# Patient Record
Sex: MALE
[De-identification: names, ages, dates, MRNs, and addresses within clinical notes are randomized per-mention and may not be internally consistent; named-entity substitution may affect disease eponyms.]

---

## 2020-04-16 ENCOUNTER — HOSPITAL ENCOUNTER (EMERGENCY)
Dept: HOSPITAL 5 - ED | Age: 40
Discharge: PSYCHIATRIC OR SUBSTANCE USE FACILITY | End: 2020-04-16
Payer: MEDICARE

## 2020-04-16 VITALS — DIASTOLIC BLOOD PRESSURE: 72 MMHG | SYSTOLIC BLOOD PRESSURE: 119 MMHG

## 2020-04-16 DIAGNOSIS — Z88.6: ICD-10-CM

## 2020-04-16 DIAGNOSIS — Z79.899: ICD-10-CM

## 2020-04-16 DIAGNOSIS — Z00.00: Primary | ICD-10-CM

## 2020-04-16 DIAGNOSIS — F17.200: ICD-10-CM

## 2020-04-16 LAB
BASOPHILS # (AUTO): 0.1 K/MM3 (ref 0–0.1)
BASOPHILS NFR BLD AUTO: 0.9 % (ref 0–1.8)
BENZODIAZEPINES SCREEN,URINE: (no result)
BILIRUB UR QL STRIP: (no result)
BLOOD UR QL VISUAL: (no result)
BUN SERPL-MCNC: 11 MG/DL (ref 9–20)
BUN/CREAT SERPL: 16 %
CALCIUM SERPL-MCNC: 9.5 MG/DL (ref 8.4–10.2)
EOSINOPHIL # BLD AUTO: 0.1 K/MM3 (ref 0–0.4)
EOSINOPHIL NFR BLD AUTO: 1.1 % (ref 0–4.3)
HCT VFR BLD CALC: 52.4 % (ref 35.5–45.6)
HEMOLYSIS INDEX: 10
HGB BLD-MCNC: 18.5 GM/DL (ref 11.8–15.2)
LYMPHOCYTES # BLD AUTO: 2.2 K/MM3 (ref 1.2–5.4)
LYMPHOCYTES NFR BLD AUTO: 29.6 % (ref 13.4–35)
MCHC RBC AUTO-ENTMCNC: 35 % (ref 32–34)
MCV RBC AUTO: 91 FL (ref 84–94)
METHADONE SCREEN,URINE: (no result)
MONOCYTES # (AUTO): 0.8 K/MM3 (ref 0–0.8)
MONOCYTES % (AUTO): 11.5 % (ref 0–7.3)
MUCOUS THREADS #/AREA URNS HPF: (no result) /HPF
OPIATE SCREEN,URINE: (no result)
PH UR STRIP: 6 [PH] (ref 5–7)
PLATELET # BLD: 185 K/MM3 (ref 140–440)
PROT UR STRIP-MCNC: (no result) MG/DL
RBC # BLD AUTO: 5.78 M/MM3 (ref 3.65–5.03)
RBC #/AREA URNS HPF: 2 /HPF (ref 0–6)
UROBILINOGEN UR-MCNC: 2 MG/DL (ref ?–2)
WBC #/AREA URNS HPF: 1 /HPF (ref 0–6)

## 2020-04-16 PROCEDURE — 80320 DRUG SCREEN QUANTALCOHOLS: CPT

## 2020-04-16 PROCEDURE — 80048 BASIC METABOLIC PNL TOTAL CA: CPT

## 2020-04-16 PROCEDURE — 81001 URINALYSIS AUTO W/SCOPE: CPT

## 2020-04-16 PROCEDURE — 85025 COMPLETE CBC W/AUTO DIFF WBC: CPT

## 2020-04-16 PROCEDURE — G0480 DRUG TEST DEF 1-7 CLASSES: HCPCS

## 2020-04-16 PROCEDURE — 36415 COLL VENOUS BLD VENIPUNCTURE: CPT

## 2020-04-16 PROCEDURE — 80307 DRUG TEST PRSMV CHEM ANLYZR: CPT

## 2020-04-16 NOTE — EMERGENCY DEPARTMENT REPORT
ED Medical Clearance HPI





- General


Chief complaint: Medical Clearance


Stated complaint: EVAL


Time Seen by Provider: 04/16/20 12:03


Source: patient, EMS


Mode of arrival: Ambulatory





- History of Present Illness


Initial comments: 





This is a 39-year-old male nontoxic, well nourished in appearance, no acute 

signs of distress presents to the ED for a medical clearance for [place].  

Patient was sent to receive blood work and urine to be medically cleared.  

Denies any URI symptoms.  Patient denies any suicidal or homicidal ideation.  

Patient otherwise denies any symptoms.  Patient denies any chest pain, shortness

of breath, fever, chills, nausea, vomiting, headache, stiff neck, numbness or 

tingling.  Patient denies any psych conditions currently.


MD Complaint: medical clearance request


Alledged Intoxication: No


Compliant with Home Medications: Yes


Traumatic Symptoms: denies traumatic injury


Associated Symptoms: denies other symptoms.  denies: chest pain, shortness of 

breath, palpitations, diaphoresis, confusion, cough, fever/chills, headaches, an

orexia, malaise, nausea/vomiting, rash, seizure, syncope, weakness


Home medications: 


                                  Previous Rx's











 Medication  Instructions  Recorded  Last Taken  Type


 


FLUoxetine HCL [FLUoxetine] 20 mg PO DAILY #7 tablet 04/16/20 Unknown Rx


 


OLANzapine [Zyprexa] 10 mg PO BID #14 tablet 04/16/20 Unknown Rx


 


hydrOXYzine PAMOATE [Vistaril] 50 mg PO Q12H #14 capsule 04/16/20 Unknown Rx


 


traZODone [Desyrel] 100 mg PO QHS #7 tablet 04/16/20 Unknown Rx











Allergies/Adverse reactions: 


                                    Allergies











Allergy/AdvReac Type Severity Reaction Status Date / Time


 


divalproex sodium Allergy  Unknown Verified 04/16/20 11:45





[From [facility]]     














ED Review of Systems


ROS: 


Stated complaint: EVAL


Other details as noted in HPI





Constitutional: denies: chills, fever


Eyes: denies: eye pain, eye discharge, vision change


ENT: denies: ear pain, throat pain


Respiratory: denies: cough, shortness of breath, wheezing


Cardiovascular: denies: chest pain, palpitations


Endocrine: no symptoms reported


Gastrointestinal: denies: abdominal pain, nausea, diarrhea


Genitourinary: denies: urgency, dysuria


Musculoskeletal: denies: back pain, joint swelling, arthralgia


Skin: denies: rash, lesions


Neurological: denies: headache, weakness, paresthesias


Psychiatric: denies: anxiety, depression


Hematological/Lymphatic: denies: easy bleeding, easy bruising





ED Past Medical Hx





- Past Medical History


Previous Medical History?: No





- Surgical History


Past Surgical History?: No





- Social History


Smoking Status: Current Every Day Smoker





- Medications


Home Medications: 


                                Home Medications











 Medication  Instructions  Recorded  Confirmed  Last Taken  Type


 


FLUoxetine HCL [FLUoxetine] 20 mg PO DAILY #7 tablet 04/16/20  Unknown Rx


 


OLANzapine [Zyprexa] 10 mg PO BID #14 tablet 04/16/20  Unknown Rx


 


hydrOXYzine PAMOATE [Vistaril] 50 mg PO Q12H #14 capsule 04/16/20  Unknown Rx


 


traZODone [Desyrel] 100 mg PO QHS #7 tablet 04/16/20  Unknown Rx














ED Physical Exam





- General


Limitations: No Limitations


General appearance: alert, in no apparent distress





- Head


Head exam: Present: atraumatic, normocephalic





- Eye


Eye exam: Present: normal appearance





- Neck


Neck exam: Present: normal inspection, full ROM.  Absent: tenderness, 

meningismus, lymphadenopathy





- Respiratory


Respiratory exam: Present: normal lung sounds bilaterally.  Absent: respiratory 

distress, wheezes, rales, rhonchi, stridor, chest wall tenderness, accessory 

muscle use, decreased breath sounds, prolonged expiratory





- Cardiovascular


Cardiovascular Exam: Present: regular rate, normal rhythm, normal heart sounds. 

 Absent: irregular rhythm, systolic murmur, diastolic murmur, rubs, gallop





- GI/Abdominal


GI/Abdominal exam: Present: soft, normal bowel sounds.  Absent: distended, 

tenderness, guarding, rebound, rigid, diminished bowel sounds





- Extremities Exam


Extremities exam: Present: normal inspection, full ROM, normal capillary refill.

  Absent: tenderness





- Back Exam


Back exam: Present: normal inspection, full ROM.  Absent: tenderness, CVA 

tenderness (R), CVA tenderness (L), muscle spasm, paraspinal tenderness, 

vertebral tenderness, rash noted





- Neurological Exam


Neurological exam: Present: alert, oriented X3, normal gait





- Psychiatric


Psychiatric exam: Present: normal affect, normal mood





- Skin


Skin exam: Present: warm, dry, intact, normal color.  Absent: rash





ED Course


                                   Vital Signs











  04/16/20





  11:50


 


Temperature 98.2 F


 


Pulse Rate 96 H


 


Respiratory 18





Rate 


 


Blood Pressure 119/72


 


O2 Sat by Pulse 94





Oximetry 














- Reevaluation(s)


Reevaluation #1: 





04/16/20 12:29


Patient is speaking in full sentences with no signs of distress noted.





ED Medical Decision Making





- Lab Data


Result diagrams: 


                                 04/16/20 12:11





                                 04/16/20 12:11





- Medical Decision Making





Patient presents with a medical clearance for [place].  Patient is stable and 

was examined by me.  Brandi Lilly from [facility] has been contacted and was 

instructed patient is medically clear.  Patient's labs are unremarkable.  Urine 

obtained.  Patient is not suicidal or homicidal ideation.  Patient was 

instructed to follow-up with a primary care doctor in 3-5 days or if symptoms 

worsen and continue return to emergency room as soon as possible.  At time of 

discharge, the patient does not seem toxic or ill in appearance.  No acute signs

 of distress noted.  Patient agrees to discharge treatment plan of care.  No 

further questions noted by the patient.








Patient requested for psych medication refill.  Dr. Morgan consulted and to 

prescribed for 1 week.





ED Disposition


Clinical Impression: 


 Medical clearance for psychiatric admission





Disposition: DC/TX-65 PSY HOSP/PSY UNIT


Is pt being admited?: No


Does the pt Need Aspirin: No


Condition: Stable


Additional Instructions: 


Follow-up with a primary care doctor in 3-5 days or if symptoms worsen and 

continue return to emergency room as soon as possible. 


Prescriptions: 


traZODone [Desyrel] 100 mg PO QHS #7 tablet


FLUoxetine HCL [FLUoxetine] 20 mg PO DAILY #7 tablet


hydrOXYzine PAMOATE [Vistaril] 50 mg PO Q12H #14 capsule


OLANzapine [Zyprexa] 10 mg PO BID #14 tablet


Referrals: 


BRENDA TIRADO MD [Primary Care Provider] - 3-5 Days


ELISA CHIU MD [Staff Physician] - 3-5 Days

## 2020-05-05 NOTE — EMERGENCY DEPARTMENT REPORT
ED General Adult HPI





- General


Chief complaint: Psych


Stated complaint: ANXIETY, COUGH


Time Seen by Provider: 05/05/20 04:33


Source: patient, EMS, old records reviewed


Mode of arrival: Ambulatory


Limitations: No Limitations





- History of Present Illness


Initial comments: 





Age start talking and I would usually just say get a this is a 39-year-old male 

with X, Y, and Z level of they are here for this they are here for that and just

with heparin was due


Severity scale (0 -10): 5


Quality: crushing


Consistency: constant


Improves with: cold therapy, eating (I did hit this your patient states that it 

only hurts when they are eating greasy foods)





- Related Data


                                  Previous Rx's











 Medication  Instructions  Recorded  Last Taken  Type


 


FLUoxetine HCL [FLUoxetine] 20 mg PO DAILY #7 tablet 04/16/20 Unknown Rx


 


OLANzapine [Zyprexa] 10 mg PO BID #14 tablet 04/16/20 Unknown Rx


 


hydrOXYzine PAMOATE [Vistaril] 50 mg PO Q12H #14 capsule 04/16/20 Unknown Rx


 


traZODone [Desyrel] 100 mg PO QHS #7 tablet 04/16/20 Unknown Rx











                                    Allergies











Allergy/AdvReac Type Severity Reaction Status Date / Time


 


divalproex sodium Allergy  Unknown Verified 04/16/20 11:45





[From Depakote]     


 


quetiapine [From Seroquel] Allergy  Unknown Verified 05/05/20 03:43














ED Review of Systems


ROS: 


Stated complaint: ANXIETY, COUGH


Other details as noted in HPI





Comment: All other systems reviewed and negative


Constitutional: denies: fever, malaise


Cardiovascular: denies: chest pain


Gastrointestinal: denies: nausea


Psychiatric: denies: auditory hallucinations, visual hallucinations, homicidal 

thoughts, suicidal thoughts





ED Past Medical Hx





- Past Medical History


Previous Medical History?: No





- Surgical History


Past Surgical History?: No





- Social History


Smoking Status: Never Smoker


Substance Use Type: None





- Medications


Home Medications: 


                                Home Medications











 Medication  Instructions  Recorded  Confirmed  Last Taken  Type


 


FLUoxetine HCL [FLUoxetine] 20 mg PO DAILY #7 tablet 04/16/20  Unknown Rx


 


OLANzapine [Zyprexa] 10 mg PO BID #14 tablet 04/16/20  Unknown Rx


 


hydrOXYzine PAMOATE [Vistaril] 50 mg PO Q12H #14 capsule 04/16/20  Unknown Rx


 


traZODone [Desyrel] 100 mg PO QHS #7 tablet 04/16/20  Unknown Rx














ED Physical Exam





- General


Limitations: No Limitations


General appearance: alert, in no apparent distress





- Head


Head exam: Present: atraumatic, normocephalic





- Eye


Eye exam: Present: normal appearance, PERRL, EOMI.  Absent: conjunctival injecti

on, nystagmus, periorbital swelling, periorbital tenderness





- ENT


ENT exam: Present: mucous membranes moist





- Neck


Neck exam: Present: normal inspection





- Respiratory


Respiratory exam: Present: normal lung sounds bilaterally.  Absent: respiratory 

distress, wheezes (The same time you know all all of these are pretty much gives

 you normal examination just), rales, rhonchi





- Cardiovascular


Cardiovascular Exam: Present: regular rate, normal rhythm.  Absent: systolic 

murmur, diastolic murmur, rubs, gallop





- GI/Abdominal


GI/Abdominal exam: Present: soft, normal bowel sounds





- Rectal


Rectal exam: Present: deferred





- Extremities Exam


Extremities exam: Present: normal inspection





- Back Exam


Back exam: Present: normal inspection





- Neurological Exam


Neurological exam: Present: alert, oriented X3





- Psychiatric


Psychiatric exam: Present: normal affect, normal mood





- Skin


Skin exam: Present: warm, dry, intact, normal color.  Absent: rash





ED Course





                                   Vital Signs











  05/05/20





  04:18


 


Temperature 97.1 F L


 


Pulse Rate 69


 


Respiratory 16





Rate 


 


Blood Pressure 124/70





[Right] 


 


O2 Sat by Pulse 97





Oximetry 














ED Medical Decision Making





- Lab Data


Result diagrams: 


                                 05/05/20 04:02





                                 05/05/20 04:02








                                   Lab Results











  05/05/20 05/05/20 05/05/20 Range/Units





  04:02 04:02 04:02 


 


WBC     (4.5-11.0)  K/mm3


 


RBC     (3.65-5.03)  M/mm3


 


Hgb     (11.8-15.2)  gm/dl


 


Hct     (35.5-45.6)  %


 


MCV     (84-94)  fl


 


MCH     (28-32)  pg


 


MCHC     (32-34)  %


 


RDW     (13.2-15.2)  %


 


Plt Count     (140-440)  K/mm3


 


Lymph % (Auto)     (13.4-35.0)  %


 


Mono % (Auto)     (0.0-7.3)  %


 


Eos % (Auto)     (0.0-4.3)  %


 


Baso % (Auto)     (0.0-1.8)  %


 


Lymph #     (1.2-5.4)  K/mm3


 


Mono #     (0.0-0.8)  K/mm3


 


Eos #     (0.0-0.4)  K/mm3


 


Baso #     (0.0-0.1)  K/mm3


 


Seg Neutrophils %     (40.0-70.0)  %


 


Seg Neutrophils #     (1.8-7.7)  K/mm3


 


Sodium    138  (137-145)  mmol/L


 


Potassium    3.7  (3.6-5.0)  mmol/L


 


Chloride    99.2  ()  mmol/L


 


Carbon Dioxide    26  (22-30)  mmol/L


 


Anion Gap    17  mmol/L


 


BUN    10  (9-20)  mg/dL


 


Creatinine    0.7 L  (0.8-1.5)  mg/dL


 


Estimated GFR    > 60  ml/min


 


BUN/Creatinine Ratio    14  %


 


Glucose    92  ()  mg/dL


 


Calcium    9.3  (8.4-10.2)  mg/dL


 


Urine Color     (Yellow)  


 


Urine Turbidity     (Clear)  


 


Urine pH     (5.0-7.0)  


 


Ur Specific Gravity     (1.003-1.030)  


 


Urine Protein     (Negative)  mg/dL


 


Urine Glucose (UA)     (Negative)  mg/dL


 


Urine Ketones     (Negative)  mg/dL


 


Urine Blood     (Negative)  


 


Urine Nitrite     (Negative)  


 


Urine Bilirubin     (Negative)  


 


Urine Urobilinogen     (<2.0)  mg/dL


 


Ur Leukocyte Esterase     (Negative)  


 


Urine WBC (Auto)     (0.0-6.0)  /HPF


 


Urine RBC (Auto)     (0.0-6.0)  /HPF


 


Salicylates  < 0.3 L    (2.8-20.0)  mg/dL


 


Urine Opiates Screen     


 


Urine Methadone Screen     


 


Acetaminophen   < 5.0 L   (10.0-30.0)  ug/mL


 


Ur Barbiturates Screen     


 


Ur Phencyclidine Scrn     


 


Ur Amphetamines Screen     


 


U Benzodiazepines Scrn     


 


Urine Cocaine Screen     


 


U Marijuana (THC) Screen     


 


Drugs of Abuse Note     


 


Plasma/Serum Alcohol     (0-0.07)  %














  05/05/20 05/05/20 05/05/20 Range/Units





  04:02 04:02 04:24 


 


WBC   10.5   (4.5-11.0)  K/mm3


 


RBC   5.17 H   (3.65-5.03)  M/mm3


 


Hgb   16.7 H   (11.8-15.2)  gm/dl


 


Hct   48.5 H   (35.5-45.6)  %


 


MCV   94   (84-94)  fl


 


MCH   32   (28-32)  pg


 


MCHC   34   (32-34)  %


 


RDW   13.8   (13.2-15.2)  %


 


Plt Count   181   (140-440)  K/mm3


 


Lymph % (Auto)   20.1   (13.4-35.0)  %


 


Mono % (Auto)   7.9 H   (0.0-7.3)  %


 


Eos % (Auto)   1.5   (0.0-4.3)  %


 


Baso % (Auto)   0.7   (0.0-1.8)  %


 


Lymph #   2.1   (1.2-5.4)  K/mm3


 


Mono #   0.8   (0.0-0.8)  K/mm3


 


Eos #   0.2   (0.0-0.4)  K/mm3


 


Baso #   0.1   (0.0-0.1)  K/mm3


 


Seg Neutrophils %   69.8   (40.0-70.0)  %


 


Seg Neutrophils #   7.4   (1.8-7.7)  K/mm3


 


Sodium     (137-145)  mmol/L


 


Potassium     (3.6-5.0)  mmol/L


 


Chloride     ()  mmol/L


 


Carbon Dioxide     (22-30)  mmol/L


 


Anion Gap     mmol/L


 


BUN     (9-20)  mg/dL


 


Creatinine     (0.8-1.5)  mg/dL


 


Estimated GFR     ml/min


 


BUN/Creatinine Ratio     %


 


Glucose     ()  mg/dL


 


Calcium     (8.4-10.2)  mg/dL


 


Urine Color    Straw  (Yellow)  


 


Urine Turbidity    Clear  (Clear)  


 


Urine pH    6.0  (5.0-7.0)  


 


Ur Specific Gravity    1.004  (1.003-1.030)  


 


Urine Protein    <15 mg/dl  (Negative)  mg/dL


 


Urine Glucose (UA)    Neg  (Negative)  mg/dL


 


Urine Ketones    Neg  (Negative)  mg/dL


 


Urine Blood    Neg  (Negative)  


 


Urine Nitrite    Neg  (Negative)  


 


Urine Bilirubin    Neg  (Negative)  


 


Urine Urobilinogen    < 2.0  (<2.0)  mg/dL


 


Ur Leukocyte Esterase    Neg  (Negative)  


 


Urine WBC (Auto)    < 1.0  (0.0-6.0)  /HPF


 


Urine RBC (Auto)    3.0  (0.0-6.0)  /HPF


 


Salicylates     (2.8-20.0)  mg/dL


 


Urine Opiates Screen     


 


Urine Methadone Screen     


 


Acetaminophen     (10.0-30.0)  ug/mL


 


Ur Barbiturates Screen     


 


Ur Phencyclidine Scrn     


 


Ur Amphetamines Screen     


 


U Benzodiazepines Scrn     


 


Urine Cocaine Screen     


 


U Marijuana (THC) Screen     


 


Drugs of Abuse Note     


 


Plasma/Serum Alcohol  < 0.01    (0-0.07)  %














  05/05/20 Range/Units





  04:24 


 


WBC   (4.5-11.0)  K/mm3


 


RBC   (3.65-5.03)  M/mm3


 


Hgb   (11.8-15.2)  gm/dl


 


Hct   (35.5-45.6)  %


 


MCV   (84-94)  fl


 


MCH   (28-32)  pg


 


MCHC   (32-34)  %


 


RDW   (13.2-15.2)  %


 


Plt Count   (140-440)  K/mm3


 


Lymph % (Auto)   (13.4-35.0)  %


 


Mono % (Auto)   (0.0-7.3)  %


 


Eos % (Auto)   (0.0-4.3)  %


 


Baso % (Auto)   (0.0-1.8)  %


 


Lymph #   (1.2-5.4)  K/mm3


 


Mono #   (0.0-0.8)  K/mm3


 


Eos #   (0.0-0.4)  K/mm3


 


Baso #   (0.0-0.1)  K/mm3


 


Seg Neutrophils %   (40.0-70.0)  %


 


Seg Neutrophils #   (1.8-7.7)  K/mm3


 


Sodium   (137-145)  mmol/L


 


Potassium   (3.6-5.0)  mmol/L


 


Chloride   ()  mmol/L


 


Carbon Dioxide   (22-30)  mmol/L


 


Anion Gap   mmol/L


 


BUN   (9-20)  mg/dL


 


Creatinine   (0.8-1.5)  mg/dL


 


Estimated GFR   ml/min


 


BUN/Creatinine Ratio   %


 


Glucose   ()  mg/dL


 


Calcium   (8.4-10.2)  mg/dL


 


Urine Color   (Yellow)  


 


Urine Turbidity   (Clear)  


 


Urine pH   (5.0-7.0)  


 


Ur Specific Gravity   (1.003-1.030)  


 


Urine Protein   (Negative)  mg/dL


 


Urine Glucose (UA)   (Negative)  mg/dL


 


Urine Ketones   (Negative)  mg/dL


 


Urine Blood   (Negative)  


 


Urine Nitrite   (Negative)  


 


Urine Bilirubin   (Negative)  


 


Urine Urobilinogen   (<2.0)  mg/dL


 


Ur Leukocyte Esterase   (Negative)  


 


Urine WBC (Auto)   (0.0-6.0)  /HPF


 


Urine RBC (Auto)   (0.0-6.0)  /HPF


 


Salicylates   (2.8-20.0)  mg/dL


 


Urine Opiates Screen  Presumptive negative  


 


Urine Methadone Screen  Presumptive negative  


 


Acetaminophen   (10.0-30.0)  ug/mL


 


Ur Barbiturates Screen  Presumptive negative  


 


Ur Phencyclidine Scrn  Presumptive negative  


 


Ur Amphetamines Screen  Presumptive negative  


 


U Benzodiazepines Scrn  Presumptive negative  


 


Urine Cocaine Screen  Presumptive negative  


 


U Marijuana (THC) Screen  Presumptive negative  


 


Drugs of Abuse Note  Disclamer  


 


Plasma/Serum Alcohol   (0-0.07)  %














- EKG Data


-: EKG Interpreted by Me


EKG shows normal: sinus rhythm, axis, intervals, QRS complexes, ST-T waves


Rate: normal





- EKG Data


Interpretation: normal EKG


Critical Care Time: No


Critical care attestation.: 


If time is entered above; I have spent that time in minutes in the direct care 

of this critically ill patient, excluding procedure time.








ED Disposition


Clinical Impression: 


 Schizophrenia





Disposition: DC-07 LEFT AGAINST MED ADVICE


Condition: Stable


Instructions:  Cor Pulmonale (ED), Chronic Hypertension (ED), Hypertensive 

Crisis (ED), Acute Gouty Arthritis (ED)


Referrals: 


PRIMARY CARE,MD [Primary Care Provider] - 3-5 Days





- Level of Consciousness


1a. Level of Consciousness: alert/keenly responsive





- LOC Questions


1b. LOC Questions: answers both correctly





- LOC Command


1c. LOC Commands: performs tasks correctly





- Best Gaze


2. Best Gaze: normal





- Visual


3. Visual: partial hemianopia





- Facial Palsy


4. Facial Palsy: minor paralysis





- Motor Arm


5a. Motor Arm Left: drift


5b. Motor Arm Right: drift





- Motor Leg


6a. Motor Leg Left: no drift


6b. Motor Leg Right: no movement





- Limb Ataxia


7. Limb Ataxia: absent





- Sensory


8. Sensory: normal





- Best Language


9. Best Language: no aphasia





- Dysarthria


10. Dysarthria: normal





- Extinction and Inattention


11. Extinction/Inattention: no abnormality





- Scoring


Total Score: 8


Stroke Severity: Moderate Stroke

## 2020-05-05 NOTE — EMERGENCY DEPARTMENT REPORT
<MIRIAM MCINTOSH - Last Filed: 05/05/20 05:09>





ED Psych HPI





- General


Chief Complaint: Psych


Stated Complaint: ANXIETY, COUGH


Time Seen by Provider: 05/05/20 04:33


Source: patient, EMS, old records reviewed


Mode of arrival: Ambulatory


Limitations: No Limitations





- History of Present Illness


Initial Comments: 





Mr. Loaiza is a 40 yo male who presents with "a mental breakdown".  He informed 

nurse that he "needs to go to a mental hospital."  He denies SI or HI.  He 

denies hallucinations.  





Recenly, Mr. Loaiza was referred to this ED for medical clearance in order to 

reside at the [facility].  





Home Medications include:  


Fluoxetine


Olanzapine


Hydroxyzine


Trazodone


MD Complaint: other ("mental breakdown")


-: unknown


Associated Psychiatric Symptoms: none


Quality: constant


Improves With: none


Worsens With: none


Context: other (recently moved to assisted living facility)


Treatments Prior to Arrival: none





- Related Data


                                  Previous Rx's











 Medication  Instructions  Recorded  Last Taken  Type


 


FLUoxetine HCL [FLUoxetine] 20 mg PO DAILY #7 tablet 04/16/20 Unknown Rx


 


OLANzapine [Zyprexa] 10 mg PO BID #14 tablet 04/16/20 Unknown Rx


 


hydrOXYzine PAMOATE [Vistaril] 50 mg PO Q12H #14 capsule 04/16/20 Unknown Rx


 


traZODone [Desyrel] 100 mg PO QHS #7 tablet 04/16/20 Unknown Rx











                                    Allergies











Allergy/AdvReac Type Severity Reaction Status Date / Time


 


divalproex sodium Allergy  Unknown Verified 04/16/20 11:45





[From Depakote]     


 


quetiapine [From Seroquel] Allergy  Unknown Verified 05/05/20 03:43














ED Review of Systems


Comment: All other systems reviewed and negative


Constitutional: denies: fever, malaise


Cardiovascular: denies: chest pain


Gastrointestinal: denies: nausea


Psychiatric: denies: auditory hallucinations, visual hallucinations, homicidal 

thoughts, suicidal thoughts





ED Past Medical Hx





- Past Medical History


Previous Medical History?: No





- Surgical History


Past Surgical History?: No





- Social History


Smoking Status: Never Smoker


Substance Use Type: None





- Medications


Home Medications: 


                                Home Medications











 Medication  Instructions  Recorded  Confirmed  Last Taken  Type


 


FLUoxetine HCL [FLUoxetine] 20 mg PO DAILY #7 tablet 04/16/20  Unknown Rx


 


OLANzapine [Zyprexa] 10 mg PO BID #14 tablet 04/16/20  Unknown Rx


 


hydrOXYzine PAMOATE [Vistaril] 50 mg PO Q12H #14 capsule 04/16/20  Unknown Rx


 


traZODone [Desyrel] 100 mg PO QHS #7 tablet 04/16/20  Unknown Rx














ED Physical Exam





- General


Limitations: No Limitations


General appearance: alert, in no apparent distress





- Head


Head exam: Present: atraumatic, normocephalic





- Eye


Eye exam: Present: normal appearance





- ENT


ENT exam: Present: mucous membranes moist





- Neck


Neck exam: Present: normal inspection, full ROM





- Respiratory


Respiratory exam: Present: normal lung sounds bilaterally.  Absent: respiratory 

distress, wheezes, rales, rhonchi





- Cardiovascular


Cardiovascular Exam: Present: regular rate, normal rhythm, normal heart sounds. 

 Absent: systolic murmur, diastolic murmur, rubs, gallop





- GI/Abdominal


GI/Abdominal exam: Present: soft, normal bowel sounds.  Absent: distended, 

tenderness, guarding, rebound





- Rectal


Rectal exam: Present: deferred





- Extremities Exam


Extremities exam: Present: normal inspection





- Back Exam


Back exam: Present: normal inspection





- Neurological Exam


Neurological exam: Present: alert, oriented X3





- Psychiatric


Psychiatric exam: Present: normal mood





- Skin


Skin exam: Present: warm, dry, intact, normal color.  Absent: rash





ED Medical Decision Making





- Lab Data


Result diagrams: 


                                 05/05/20 04:02





                                 05/05/20 04:02





- Medical Decision Making





Mr. Loaiza is calm, cooperative.  Desires to sleep.  Unclear why he desires to 

be admitted to inpatient psychiatric facility.  He is currently medically clear 

for psychiatric care.  Awaiting recommendations from our psychiatric team.





I have reviewed labs which are within normal limits.





ED Disposition


Clinical Impression: 


 Mental health disorder, Schizophrenia





Disposition: DC-07 LEFT AGAINST MED ADVICE


Condition: Stable


Instructions:  Cor Pulmonale (ED), Acute Gouty Arthritis (ED), Chronic 

Hypertension (ED), Hypertensive Crisis (ED)


Referrals: 


PRIMARY CARE,MD [Primary Care Provider] - 3-5 Days





<BASHIR MONTENEGRO - Last Filed: 05/06/20 11:44>





ED Review of Systems


ROS: 


Stated complaint: ANXIETY, COUGH


Other details as noted in HPI








ED Course


                                   Vital Signs











  05/05/20 05/05/20





  04:18 16:05


 


Temperature 97.1 F L 97.8 F


 


Pulse Rate 69 72


 


Respiratory 16 18





Rate  


 


Blood Pressure 124/70 115/62





[Right]  


 


O2 Sat by Pulse 97 96





Oximetry  














ED Medical Decision Making





- Lab Data


Result diagrams: 


                                 05/05/20 04:02





                                 05/05/20 04:02





- Medical Decision Making


Patient is 39 years old male with no significant past medical history.  Patient 

admitted to the ER for evaluation of mental health breakdown.  Patient is 

currently denying any suicidal or homicidal ideation.  Patient has been 

evaluated by our psychiatric team and advised patient to be discharge home and 

to follow-up as an outpatient.  Patient is medically and psychiatrically stable 

for discharge.


Critical care attestation.: 


If time is entered above; I have spent that time in minutes in the direct care 

of this critically ill patient, excluding procedure time.








ED Disposition


Is pt being admited?: No

## 2020-05-27 NOTE — EMERGENCY DEPARTMENT REPORT
Chief Complaint: Psych


Stated Complaint: MH


Time Seen by Provider: 05/27/20 04:34





- HPI


History of Present Illness: 





Patient is a 39-year-old  male with schizophrenia who often goes 

missing who is presenting stated he was having a mental breakdown.  When asked 

what his mental breakdown was he states "I could not stop crying" patient is 

currently not crying.  Patient will not answer where he lives or who he stays 

with.  Patient denies being homicidal suicidal at this time.  Our entire 

conversation the patient was making his she is comfortable for him so that he 

could lie down and go to bed





- ROS


Review of Systems: 





All systems are reviewed and are negative





- Exam


Vital Signs: 


                                   Vital Signs











  05/27/20





  04:00


 


Temperature 98.7 F


 


Pulse Rate 90


 


Respiratory 18





Rate 


 


Blood Pressure 119/76


 


O2 Sat by Pulse 96





Oximetry 











Physical Exam: 





Patient is in no acute distress.  Speaking in full sentences.  Lungs clear to 

auscultation his heart tones are normal.  Moving all extremities.


MSE screening note: 


Focused history and physical exam performed.


Due to findings the following was ordered:











ED Medical Decision Making





- Lab Data


Result diagrams: 


                                 05/27/20 04:15








- Medical Decision Making





Patient does not appear to be an mental health crisis at this time.  We have 

contacted [facility] police to ensure that the patient is not missing.  

Patient will be held here until they arrive but will be discharged.





ED Disposition for MSE


Clinical Impression: 


Schizophrenia


Qualifiers:


 Schizophrenia type: unspecified Qualified Code(s): F20.9 - Schizophrenia, 

unspecified





Disposition: Z-07 MED SCREENING EXAM-LEFT


Is pt being admited?: No


Does the pt Need Aspirin: No


Condition: Stable


Referrals: 


PRIMARY CARE,MD [Primary Care Provider] - 3-5 Days


Time of Disposition: 04:38

## 2020-06-09 NOTE — EMERGENCY DEPARTMENT REPORT
<MARINA MCINTOSH - Last Filed: 06/09/20 22:41>





ED Psych HPI





- General


Chief Complaint: Psych


Stated Complaint: MH EVAL/HEARING VOICES


Time Seen by Provider: 06/09/20 21:34


Source: patient


Mode of arrival: Ambulatory





- History of Present Illness


Initial Comments: 





Patient is a 39-year-old  male with a past medical history of 

schizophrenia who he has been off of his medications for several weeks.  Patient

is having visual and auditory hallucinations.  Patient states he he is a loud 

sound of people crying and sees crying faces.  Patient states he feels more 

confused.  He is denying any homicidal suicidal ideations at this time.





- Related Data


                                  Previous Rx's











 Medication  Instructions  Recorded  Last Taken  Type


 


FLUoxetine HCL [FLUoxetine] 20 mg PO DAILY #7 tablet 04/16/20 Unknown Rx


 


OLANzapine [Zyprexa] 10 mg PO BID #14 tablet 04/16/20 Unknown Rx


 


hydrOXYzine PAMOATE [Vistaril] 50 mg PO Q12H #14 capsule 04/16/20 Unknown Rx


 


traZODone [Desyrel] 100 mg PO QHS #7 tablet 04/16/20 Unknown Rx











                                    Allergies











Allergy/AdvReac Type Severity Reaction Status Date / Time


 


divalproex sodium Allergy  Unknown Verified 04/16/20 11:45





[From Depakote]     


 


quetiapine [From Seroquel] Allergy  Unknown Verified 05/05/20 03:43


 


risperidone [From Risperdal] Allergy  Unknown Verified 06/09/20 20:16














ED Review of Systems


Comment: All other systems reviewed and negative





ED Past Medical Hx





- Past Medical History


Previous Medical History?: Yes


Hx Psychiatric Treatment: Yes (bipolar, schizo)





- Surgical History


Past Surgical History?: No





- Social History


Smoking Status: Current Every Day Smoker





- Medications


Home Medications: 


                                Home Medications











 Medication  Instructions  Recorded  Confirmed  Last Taken  Type


 


FLUoxetine HCL [FLUoxetine] 20 mg PO DAILY #7 tablet 04/16/20  Unknown Rx


 


OLANzapine [Zyprexa] 10 mg PO BID #14 tablet 04/16/20  Unknown Rx


 


hydrOXYzine PAMOATE [Vistaril] 50 mg PO Q12H #14 capsule 04/16/20  Unknown Rx


 


traZODone [Desyrel] 100 mg PO QHS #7 tablet 04/16/20  Unknown Rx














ED Physical Exam





- General


Limitations: No Limitations


General appearance: alert, in no apparent distress





- Head


Head exam: Present: atraumatic, normocephalic





- Eye


Eye exam: Present: normal appearance





- ENT


ENT exam: Present: mucous membranes moist





- Neck


Neck exam: Present: normal inspection





- Respiratory


Respiratory exam: Present: normal lung sounds bilaterally.  Absent: respiratory 

distress





- Cardiovascular


Cardiovascular Exam: Present: regular rate, normal rhythm.  Absent: systolic m

urmur, diastolic murmur, rubs, gallop





- GI/Abdominal


GI/Abdominal exam: Present: soft, normal bowel sounds.  Absent: distended, 

tenderness, guarding





- Rectal


Rectal exam: Present: deferred





- Extremities Exam


Extremities exam: Present: normal inspection





- Back Exam


Back exam: Present: normal inspection





- Neurological Exam


Neurological exam: Present: alert, oriented X3





- Psychiatric


Psychiatric exam: Present: normal mood, flat affect





- Skin


Skin exam: Present: warm, dry, intact, normal color.  Absent: rash





ED Course





- Reevaluation(s)


Reevaluation #1: 





06/09/20 22:42


Patient is a mental health hold at this time.





ED Medical Decision Making





- Lab Data


Result diagrams: 


                                 06/09/20 20:41





                                 06/09/20 20:41








                                   Lab Results











  06/09/20 06/09/20 06/09/20 Range/Units





  20:41 20:41 20:41 


 


WBC     (4.5-11.0)  K/mm3


 


RBC     (3.65-5.03)  M/mm3


 


Hgb     (11.8-15.2)  gm/dl


 


Hct     (35.5-45.6)  %


 


MCV     (84-94)  fl


 


MCH     (28-32)  pg


 


MCHC     (32-34)  %


 


RDW     (13.2-15.2)  %


 


Plt Count     (140-440)  K/mm3


 


Lymph % (Auto)     (13.4-35.0)  %


 


Mono % (Auto)     (0.0-7.3)  %


 


Eos % (Auto)     (0.0-4.3)  %


 


Baso % (Auto)     (0.0-1.8)  %


 


Lymph #     (1.2-5.4)  K/mm3


 


Mono #     (0.0-0.8)  K/mm3


 


Eos #     (0.0-0.4)  K/mm3


 


Baso #     (0.0-0.1)  K/mm3


 


Seg Neutrophils %     (40.0-70.0)  %


 


Seg Neutrophils #     (1.8-7.7)  K/mm3


 


Sodium    135 L  (137-145)  mmol/L


 


Potassium    3.7  (3.6-5.0)  mmol/L


 


Chloride    99.8  ()  mmol/L


 


Carbon Dioxide    26  (22-30)  mmol/L


 


Anion Gap    13  mmol/L


 


BUN    6 L  (9-20)  mg/dL


 


Creatinine    0.7 L  (0.8-1.5)  mg/dL


 


Estimated GFR    > 60  ml/min


 


BUN/Creatinine Ratio    9  %


 


Glucose    89  ()  mg/dL


 


Calcium    9.0  (8.4-10.2)  mg/dL


 


Salicylates  < 0.3 L    (2.8-20.0)  mg/dL


 


Acetaminophen   < 5.0 L   (10.0-30.0)  ug/mL


 


Plasma/Serum Alcohol     (0-0.07)  %














  06/09/20 06/09/20 Range/Units





  20:41 20:41 


 


WBC   8.6  (4.5-11.0)  K/mm3


 


RBC   5.09 H  (3.65-5.03)  M/mm3


 


Hgb   16.4 H  (11.8-15.2)  gm/dl


 


Hct   47.6 H  (35.5-45.6)  %


 


MCV   94  (84-94)  fl


 


MCH   32  (28-32)  pg


 


MCHC   34  (32-34)  %


 


RDW   13.6  (13.2-15.2)  %


 


Plt Count   185  (140-440)  K/mm3


 


Lymph % (Auto)   28.8  (13.4-35.0)  %


 


Mono % (Auto)   8.9 H  (0.0-7.3)  %


 


Eos % (Auto)   2.2  (0.0-4.3)  %


 


Baso % (Auto)   1.1  (0.0-1.8)  %


 


Lymph #   2.5  (1.2-5.4)  K/mm3


 


Mono #   0.8  (0.0-0.8)  K/mm3


 


Eos #   0.2  (0.0-0.4)  K/mm3


 


Baso #   0.1  (0.0-0.1)  K/mm3


 


Seg Neutrophils %   59.0  (40.0-70.0)  %


 


Seg Neutrophils #   5.1  (1.8-7.7)  K/mm3


 


Sodium    (137-145)  mmol/L


 


Potassium    (3.6-5.0)  mmol/L


 


Chloride    ()  mmol/L


 


Carbon Dioxide    (22-30)  mmol/L


 


Anion Gap    mmol/L


 


BUN    (9-20)  mg/dL


 


Creatinine    (0.8-1.5)  mg/dL


 


Estimated GFR    ml/min


 


BUN/Creatinine Ratio    %


 


Glucose    ()  mg/dL


 


Calcium    (8.4-10.2)  mg/dL


 


Salicylates    (2.8-20.0)  mg/dL


 


Acetaminophen    (10.0-30.0)  ug/mL


 


Plasma/Serum Alcohol  < 0.01   (0-0.07)  %














ED Disposition


Clinical Impression: 


 Schizophrenia, Homeless





Disposition: DC-01 TO HOME OR SELFCARE


Condition: Stable


Instructions:  Schizophrenia (ED)


Additional Instructions: 


Take your current medications as prescribed.  Follow-up with your doctor or 

doctor/clinic provided.  Return if symptoms worsen as indicated by your disch

arge instructions.





Refer to additional mental health resources provided on separate handout.


Referrals: 


PRIMARY CARE,MD [Primary Care Provider] - 3-5 Days


[facility] [Outside] - 3-5 Days


[facility] [Provider Group] - 3-5 Days





<JACQUES ECHOLS - Last Filed: 06/10/20 11:02>





ED Review of Systems


ROS: 


Stated complaint: MH EVAL/HEARING VOICES


Other details as noted in HPI








ED Course





                                   Vital Signs











  06/09/20 06/10/20 06/10/20





  21:25 02:03 07:31


 


Temperature 97.9 F 98.4 F 97.6 F


 


Pulse Rate 67 63 70


 


Respiratory 17 16 20





Rate   


 


Blood Pressure 117/72 112/63 108/68





[Right]   


 


O2 Sat by Pulse 95 95 98





Oximetry   














ED Medical Decision Making





- Lab Data


Result diagrams: 


                                 06/09/20 20:41





                                 06/09/20 20:41





- Medical Decision Making





Patient seen and evaluated by mental health.  Patient with schizophrenia and s

tates hallucinations have improved.  Patient does not meet 1013 criteria.  

Please refer to mental health  note.  Patient will be discharged home to

 fill his current meds and follow-up as an outpatient.


Critical Care Time: No


Critical care attestation.: 


If time is entered above; I have spent that time in minutes in the direct care 

of this critically ill patient, excluding procedure time.








ED Disposition


Is pt being admited?: No


Does the pt Need Aspirin: No


Time of Disposition: 11:01

## 2020-06-17 NOTE — EMERGENCY DEPARTMENT REPORT
ED General Adult HPI





- General


Chief complaint: Abdominal Pain


Stated complaint: ABDOMINAL PAIN


Time Seen by Provider: 06/17/20 03:48


Source: patient


Mode of arrival: Ambulatory


Limitations: No Limitations





- History of Present Illness


Initial comments: 





Patient is a 39-year-old male presents emergency room with complaints of nausea 

vomiting that began 3 days ago.  He states he is able to tolerate p.o. intake.  

He denies any diarrhea, abdominal pain, urinary symptoms, fever, hematemesis, 

hematochezia, melena.  He states he had a normal bowel movement today.  He 

denies any past medical history.  He states he does not have any allergies to 

medications.





- Related Data


                                  Previous Rx's











 Medication  Instructions  Recorded  Last Taken  Type


 


FLUoxetine HCL [FLUoxetine] 20 mg PO DAILY #7 tablet 04/16/20 Unknown Rx


 


OLANzapine [Zyprexa] 10 mg PO BID #14 tablet 04/16/20 Unknown Rx


 


hydrOXYzine PAMOATE [Vistaril] 50 mg PO Q12H #14 capsule 04/16/20 Unknown Rx


 


traZODone [Desyrel] 100 mg PO QHS #7 tablet 04/16/20 Unknown Rx


 


Ondansetron [Zofran Odt] 4 mg PO Q8HR PRN #10 tab.rapdis 06/17/20 Unknown Rx











                                    Allergies











Allergy/AdvReac Type Severity Reaction Status Date / Time


 


divalproex sodium Allergy  Unknown Verified 04/16/20 11:45





[From Depakote]     


 


quetiapine [From Seroquel] Allergy  Unknown Verified 05/05/20 03:43


 


risperidone [From Risperdal] Allergy  Unknown Verified 06/09/20 20:16














ED Review of Systems


ROS: 


Stated complaint: ABDOMINAL PAIN


Other details as noted in HPI





Comment: All other systems reviewed and negative





ED Past Medical Hx





- Past Medical History


Previous Medical History?: Yes


Hx Psychiatric Treatment: Yes (bipolar, schizo)





- Surgical History


Past Surgical History?: No





- Social History


Smoking Status: Current Every Day Smoker


Substance Use Type: Alcohol





- Medications


Home Medications: 


                                Home Medications











 Medication  Instructions  Recorded  Confirmed  Last Taken  Type


 


FLUoxetine HCL [FLUoxetine] 20 mg PO DAILY #7 tablet 04/16/20  Unknown Rx


 


OLANzapine [Zyprexa] 10 mg PO BID #14 tablet 04/16/20  Unknown Rx


 


hydrOXYzine PAMOATE [Vistaril] 50 mg PO Q12H #14 capsule 04/16/20  Unknown Rx


 


traZODone [Desyrel] 100 mg PO QHS #7 tablet 04/16/20  Unknown Rx


 


Ondansetron [Zofran Odt] 4 mg PO Q8HR PRN #10 tab.rapdis 06/17/20  Unknown Rx














ED Physical Exam





- General


Limitations: No Limitations


General appearance: alert, in no apparent distress





- Head


Head exam: Present: atraumatic, normocephalic





- Eye


Eye exam: Present: normal appearance





- ENT


ENT exam: Present: mucous membranes moist





- Respiratory


Respiratory exam: Present: normal lung sounds bilaterally.  Absent: respiratory 

distress, wheezes, rales, rhonchi, stridor, chest wall tenderness, accessory 

muscle use, decreased breath sounds, prolonged expiratory





- Cardiovascular


Cardiovascular Exam: Present: regular rate, normal rhythm, normal heart sounds. 

Absent: systolic murmur, diastolic murmur, rubs, gallop





- GI/Abdominal


GI/Abdominal exam: Present: soft, normal bowel sounds.  Absent: distended, 

tenderness, guarding, rebound, rigid





- Neurological Exam


Neurological exam: Present: alert, oriented X3





- Psychiatric


Psychiatric exam: Present: normal affect, normal mood





- Skin


Skin exam: Present: warm, dry, intact





ED Course


                                   Vital Signs











  06/17/20 06/17/20






  03:05 05:29


 


Temperature 98.1 F 


 


Pulse Rate 95 H 85


 


Respiratory 18 17





Rate  


 


Blood Pressure 126/64 


 


O2 Sat by Pulse 96 99





Oximetry  














ED Medical Decision Making





- Lab Data


Result diagrams: 


                                 06/17/20 03:37





                                 06/17/20 03:37








                                   Lab Results











  06/17/20 06/17/20 06/17/20 Range/Units





  03:37 03:37 03:41 


 


WBC  9.7    (4.5-11.0)  K/mm3


 


RBC  5.45 H    (3.65-5.03)  M/mm3


 


Hgb  17.6 H    (11.8-15.2)  gm/dl


 


Hct  50.4 H    (35.5-45.6)  %


 


MCV  93    (84-94)  fl


 


MCH  32    (28-32)  pg


 


MCHC  35 H    (32-34)  %


 


RDW  13.4    (13.2-15.2)  %


 


Plt Count  194    (140-440)  K/mm3


 


Lymph % (Auto)  22.1    (13.4-35.0)  %


 


Mono % (Auto)  8.1 H    (0.0-7.3)  %


 


Eos % (Auto)  1.3    (0.0-4.3)  %


 


Baso % (Auto)  0.7    (0.0-1.8)  %


 


Lymph #  2.1    (1.2-5.4)  K/mm3


 


Mono #  0.8    (0.0-0.8)  K/mm3


 


Eos #  0.1    (0.0-0.4)  K/mm3


 


Baso #  0.1    (0.0-0.1)  K/mm3


 


Seg Neutrophils %  67.8    (40.0-70.0)  %


 


Seg Neutrophils #  6.6    (1.8-7.7)  K/mm3


 


Sodium   136 L   (137-145)  mmol/L


 


Potassium   3.7   (3.6-5.0)  mmol/L


 


Chloride   99.7   ()  mmol/L


 


Carbon Dioxide   23   (22-30)  mmol/L


 


Anion Gap   17   mmol/L


 


BUN   7 L   (9-20)  mg/dL


 


Creatinine   0.7 L   (0.8-1.5)  mg/dL


 


Estimated GFR   > 60   ml/min


 


BUN/Creatinine Ratio   10   %


 


Glucose   83   ()  mg/dL


 


Calcium   9.3   (8.4-10.2)  mg/dL


 


Total Bilirubin   0.70   (0.1-1.2)  mg/dL


 


AST   28   (5-40)  units/L


 


ALT   10   (7-56)  units/L


 


Alkaline Phosphatase   112   ()  units/L


 


Total Protein   7.8   (6.3-8.2)  g/dL


 


Albumin   4.1   (3.9-5)  g/dL


 


Albumin/Globulin Ratio   1.1   %


 


Lipase     (13-60)  units/L


 


Urine Color    Colorless  (Yellow)  


 


Urine Turbidity    Clear  (Clear)  


 


Urine pH    6.0  (5.0-7.0)  


 


Ur Specific Gravity    1.001 L  (1.003-1.030)  


 


Urine Protein    <15 mg/dl  (Negative)  mg/dL


 


Urine Glucose (UA)    Neg  (Negative)  mg/dL


 


Urine Ketones    Neg  (Negative)  mg/dL


 


Urine Blood    Neg  (Negative)  


 


Urine Nitrite    Neg  (Negative)  


 


Urine Bilirubin    Neg  (Negative)  


 


Urine Urobilinogen    < 2.0  (<2.0)  mg/dL


 


Ur Leukocyte Esterase    Neg  (Negative)  


 


Urine WBC (Auto)    < 1.0  (0.0-6.0)  /HPF


 


Urine RBC (Auto)    < 1.0  (0.0-6.0)  /HPF














  06/17/20 Range/Units





  04:04 


 


WBC   (4.5-11.0)  K/mm3


 


RBC   (3.65-5.03)  M/mm3


 


Hgb   (11.8-15.2)  gm/dl


 


Hct   (35.5-45.6)  %


 


MCV   (84-94)  fl


 


MCH   (28-32)  pg


 


MCHC   (32-34)  %


 


RDW   (13.2-15.2)  %


 


Plt Count   (140-440)  K/mm3


 


Lymph % (Auto)   (13.4-35.0)  %


 


Mono % (Auto)   (0.0-7.3)  %


 


Eos % (Auto)   (0.0-4.3)  %


 


Baso % (Auto)   (0.0-1.8)  %


 


Lymph #   (1.2-5.4)  K/mm3


 


Mono #   (0.0-0.8)  K/mm3


 


Eos #   (0.0-0.4)  K/mm3


 


Baso #   (0.0-0.1)  K/mm3


 


Seg Neutrophils %   (40.0-70.0)  %


 


Seg Neutrophils #   (1.8-7.7)  K/mm3


 


Sodium   (137-145)  mmol/L


 


Potassium   (3.6-5.0)  mmol/L


 


Chloride   ()  mmol/L


 


Carbon Dioxide   (22-30)  mmol/L


 


Anion Gap   mmol/L


 


BUN   (9-20)  mg/dL


 


Creatinine   (0.8-1.5)  mg/dL


 


Estimated GFR   ml/min


 


BUN/Creatinine Ratio   %


 


Glucose   ()  mg/dL


 


Calcium   (8.4-10.2)  mg/dL


 


Total Bilirubin   (0.1-1.2)  mg/dL


 


AST   (5-40)  units/L


 


ALT   (7-56)  units/L


 


Alkaline Phosphatase   ()  units/L


 


Total Protein   (6.3-8.2)  g/dL


 


Albumin   (3.9-5)  g/dL


 


Albumin/Globulin Ratio   %


 


Lipase  26  (13-60)  units/L


 


Urine Color   (Yellow)  


 


Urine Turbidity   (Clear)  


 


Urine pH   (5.0-7.0)  


 


Ur Specific Gravity   (1.003-1.030)  


 


Urine Protein   (Negative)  mg/dL


 


Urine Glucose (UA)   (Negative)  mg/dL


 


Urine Ketones   (Negative)  mg/dL


 


Urine Blood   (Negative)  


 


Urine Nitrite   (Negative)  


 


Urine Bilirubin   (Negative)  


 


Urine Urobilinogen   (<2.0)  mg/dL


 


Ur Leukocyte Esterase   (Negative)  


 


Urine WBC (Auto)   (0.0-6.0)  /HPF


 


Urine RBC (Auto)   (0.0-6.0)  /HPF














- Medical Decision Making





Patient is a 39-year-old male presents emergency room with complaints of nausea 

vomiting that began 3 days ago.  He states he is able to tolerate p.o. intake.  

He denies any diarrhea, abdominal pain, urinary symptoms, fever, hematemesis, 

hematochezia, melena.  He states he had a normal bowel movement today.  He 

denies any past medical history.  He states he does not have any allergies to 

medications.  Vitals are normal.  On exam no abdominal tenderness to palpation, 

no guarding, no rebound, no rigidity, no peritoneal signs, normal bowel sounds. 

Mucous membranes are moist.  Labs are stable.  UA without signs of UTI or 

dehydration.  Patient given Zofran ODT.  Patient had no further episodes of 

vomiting while in the emergency department was able to tolerate p.o. intake 

without difficulty. pt given prescription for zofran. advised pt Please take 

medication as prescribed as needed.  Increase your water intake.  Eat a bland 

diet.  Follow-up with a primary care doctor.  Follow-up with a GI doctor.  

Return to the emergency room for any new or worsening symptoms.





- Differential Diagnosis


gastritis, PUD, GERD, dehydration, pancreatitis, electrolyte disturbance


Critical care attestation.: 


If time is entered above; I have spent that time in minutes in the direct care 

of this critically ill patient, excluding procedure time.








ED Disposition


Clinical Impression: 


Nausea & vomiting


Qualifiers:


 Vomiting type: unspecified Vomiting Intractability: non-intractable Qualified 

Code(s): R11.2 - Nausea with vomiting, unspecified





Disposition: DC-01 TO HOME OR SELFCARE


Is pt being admited?: No


Does the pt Need Aspirin: No


Condition: Stable


Instructions:  Gastritis (ED)


Additional Instructions: 


Please take medication as prescribed as needed.  Increase your water intake.  

Eat a bland diet.  Follow-up with a primary care doctor.  Follow-up with a GI 

doctor.  Return to the emergency room for any new or worsening symptoms.


Prescriptions: 


Ondansetron [Zofran Odt] 4 mg PO Q8HR PRN #10 tab.rapdis


 PRN Reason: Nausea And Vomiting


Referrals: 


ELISA CHIU MD [Staff Physician] - 2-3 Days


[facility] [Provider Group] - 2-3 Days


[facility] [Outside] - 2-3 Days


[facility] [Provider Group] - 2-3 Days


Time of Disposition: 04:58


Print Language: ENGLISH

## 2020-11-19 NOTE — XRAY REPORT
CHEST 2 VIEWS 



INDICATION / CLINICAL INFORMATION:

CHEST PAIN.



COMPARISON: 

6/25/2020



FINDINGS:



SUPPORT DEVICES: None.



HEART / MEDIASTINUM: No significant abnormality. 



LUNGS / PLEURA: No significant pulmonary or pleural abnormality. No pneumothorax. 



ADDITIONAL FINDINGS: No significant additional findings.



IMPRESSION:

1. No acute findings.



Signer Name: Matti Rico MD 

Signed: 11/19/2020 4:49 AM

Workstation Name: [id number]

## 2020-11-19 NOTE — EMERGENCY DEPARTMENT REPORT
ED General Adult HPI





- General


Chief complaint: Chest Pain


Stated complaint: BILATERAL ARM PAIN


Time Seen by Provider: 11/19/20 05:59


Source: patient, EMS


Mode of arrival: Ambulatory


Limitations: No Limitations





- History of Present Illness


Initial comments: 





40-year-old very thin male presents emerged department complaint complaining of 

chest pain on his left and right side which tends to fluctuate been off and on 

for the past few weeks unknown characteristic that he is unable to explain 

associated with no palliative or provocative factors.


Associated Symptoms: chest pain.  denies: diaphoresis, fever/chills, loss of 

appetite, nausea/vomiting, shortness of breath, syncope





- Related Data


                                  Previous Rx's











 Medication  Instructions  Recorded  Last Taken  Type


 


FLUoxetine HCL [FLUoxetine] 20 mg PO DAILY #7 tablet 04/16/20 Unknown Rx


 


OLANzapine [Zyprexa] 10 mg PO BID #14 tablet 04/16/20 Unknown Rx


 


hydrOXYzine PAMOATE [Vistaril] 50 mg PO Q12H #14 capsule 04/16/20 Unknown Rx


 


traZODone [Desyrel] 100 mg PO QHS #7 tablet 04/16/20 Unknown Rx


 


Ondansetron [Zofran Odt] 4 mg PO Q8HR PRN #10 tab.rapdis 06/17/20 Unknown Rx











                                    Allergies











Allergy/AdvReac Type Severity Reaction Status Date / Time


 


divalproex sodium Allergy  Unknown Verified 06/28/20 17:52





[From Depakote]     


 


quetiapine [From Seroquel] Allergy  Unknown Verified 06/28/20 17:52


 


risperidone [From Risperdal] Allergy  Unknown Verified 06/28/20 17:52














ED Review of Systems


ROS: 


Stated complaint: BILATERAL ARM PAIN


Other details as noted in HPI





Comment: All other systems reviewed and negative





ED Past Medical Hx





- Past Medical History


Hx Psychiatric Treatment: Yes (bipolar, schizo)





- Social History


Smoking Status: Current Every Day Smoker





- Medications


Home Medications: 


                                Home Medications











 Medication  Instructions  Recorded  Confirmed  Last Taken  Type


 


FLUoxetine HCL [FLUoxetine] 20 mg PO DAILY #7 tablet 04/16/20  Unknown Rx


 


OLANzapine [Zyprexa] 10 mg PO BID #14 tablet 04/16/20  Unknown Rx


 


hydrOXYzine PAMOATE [Vistaril] 50 mg PO Q12H #14 capsule 04/16/20  Unknown Rx


 


traZODone [Desyrel] 100 mg PO QHS #7 tablet 04/16/20  Unknown Rx


 


Ondansetron [Zofran Odt] 4 mg PO Q8HR PRN #10 tab.rapdis 06/17/20  Unknown Rx














ED Physical Exam





- General


Limitations: No Limitations


General appearance: alert, in no apparent distress





- Head


Head exam: Present: atraumatic, normocephalic





- Eye


Eye exam: Present: normal appearance





- ENT


ENT exam: Present: mucous membranes moist





- Neck


Neck exam: Present: normal inspection





- Respiratory


Respiratory exam: Present: normal lung sounds bilaterally.  Absent: respiratory 

distress





- Cardiovascular


Cardiovascular Exam: Present: regular rate, normal rhythm.  Absent: systolic 

murmur, diastolic murmur, rubs, gallop





- GI/Abdominal


GI/Abdominal exam: Present: soft, normal bowel sounds





- Rectal


Rectal exam: Present: deferred





- Extremities Exam


Extremities exam: Present: normal inspection





- Back Exam


Back exam: Present: normal inspection





- Neurological Exam


Neurological exam: Present: alert, oriented X3





- Psychiatric


Psychiatric exam: Present: normal affect, normal mood





- Skin


Skin exam: Present: warm, dry, intact, normal color.  Absent: rash





ED Course





                                   Vital Signs











  11/19/20





  01:07


 


Temperature 97.6 F


 


Pulse Rate 64


 


Respiratory 18





Rate 


 


Blood Pressure 115/60


 


O2 Sat by Pulse 97





Oximetry 











Critical care attestation.: 


If time is entered above; I have spent that time in minutes in the direct care 

of this critically ill patient, excluding procedure time.








ED Disposition


Disposition: DC-01 TO HOME OR SELFCARE


Condition: Stable


Instructions:  Nonspecific Chest Pain, Adult, Chest Pain (ED)


Referrals: 


PRIMARY CARE,MD [Primary Care Provider] - 3-5 Days

## 2021-05-13 NOTE — ELECTROCARDIOGRAPH REPORT
[facility]

                                       

Test Date:    2021               Test Time:    00:18:33

Pat Name:     IFTIKHAR STAPLETON          Department:   

Patient ID:   [id number]          Room:          

Gender:       M                        Technician:   EDILBERTO

:          1980               Requested By: TAMI SMITH

Order Number: [id number]              Reading MD:   Fatoumata Jorge

                                 Measurements

Intervals                              Axis          

Rate:         68                       P:            -29

AL:           123                      QRS:          78

QRSD:         91                       T:            28

QT:           421                                    

QTc:          449                                    

                           Interpretive Statements

Sinus rhythm

No previous ECG available for comparison

Electronically Signed On 2021 12:09:29 EDT by Fatoumata Jorge

## 2021-05-18 NOTE — ELECTROCARDIOGRAPH REPORT
[facility]

                                       

Test Date:    2021-05-15               Test Time:    00:05:42

Pat Name:     IFTIKHAR STAPLETON          Department:   

Patient ID:   [id number]          Room:          

Gender:       M                        Technician:   VERITO

:          1980               Requested By: JOSE ROBERTS III

Order Number: [id number]              Reading MD:   Fatoumata Jorge

                                 Measurements

Intervals                              Axis          

Rate:         77                       P:            -13

DE:           118                      QRS:          79

QRSD:         98                       T:            41

QT:           399                                    

QTc:          453                                    

                           Interpretive Statements

Sinus rhythm

Nonspecific T abnormalities, anterior leads

Compared to ECG 2021 00:18:33

Electronically Signed On 2021 17:34:59 EDT by Fatoumata Jorge

## 2021-06-25 NOTE — EMERGENCY DEPARTMENT REPORT
HPI





- General


Chief Complaint: Chest Pain


Time Seen by Provider: 06/25/21 07:26





- HPI


HPI: 





Room 24








The patient is a 40-year-old male present with a chief complaint of chest pain. 

Patient states he developed pain in the left chest last night has been constant 

in nature.  Patient denies shortness of breath, nausea/vomiting or diaphoresis 

with this pain.  Patient denies history of cough or fever.  Patient denies 

suicidal or homicidal ideation





ED Past Medical Hx





- Past Medical History


Previous Medical History?: Yes


Hx Psychiatric Treatment: Yes (bipolar, schizophrenia)





- Surgical History


Past Surgical History?: No





- Family History


Family history: no significant





- Social History


Smoking Status: Never Smoker


Substance Use Type: None (Denies illicit drug use)





- Medications


Home Medications: 


                                Home Medications











 Medication  Instructions  Recorded  Confirmed  Last Taken  Type


 


FLUoxetine HCL [FLUoxetine] 20 mg PO DAILY #7 tablet 04/16/20  Unknown Rx


 


OLANzapine [Zyprexa] 10 mg PO BID #14 tablet 04/16/20  Unknown Rx


 


hydrOXYzine PAMOATE [Vistaril] 50 mg PO Q12H #14 capsule 04/16/20  Unknown Rx


 


traZODone [Desyrel] 100 mg PO QHS #7 tablet 04/16/20  Unknown Rx


 


Ondansetron [Zofran Odt] 4 mg PO Q8HR PRN #10 tab.rapdis 06/17/20  Unknown Rx


 


Ondansetron [Zofran Odt] 4 mg PO Q6H #20 tab.rapdis 05/13/21  Unknown Rx














ED Review of Systems


ROS: 


Stated complaint: CHEST PAIN


Other details as noted in HPI





Constitutional: denies: diaphoresis, fever


Eyes: denies: eye pain


ENT: denies: throat pain


Respiratory: denies: shortness of breath


Cardiovascular: chest pain


Endocrine: no symptoms reported


Gastrointestinal: denies: nausea, vomiting


Genitourinary: denies: dysuria


Musculoskeletal: denies: back pain


Neurological: denies: headache


Psychiatric: denies: homicidal thoughts, suicidal thoughts





Physical Exam





- Physical Exam


Vital Signs: 


                                   Vital Signs











  06/25/21





  01:15


 


Temperature 98 F


 


Pulse Rate 78


 


Respiratory 16





Rate 


 


Blood Pressure 153/78


 


O2 Sat by Pulse 97





Oximetry 











Physical Exam: 





GENERAL: The patient is well-developed well-nourished male lying on stretcher 

not appearing to be in acute distress. []


HEENT: Normocephalic.  Atraumatic.  Extraocular motions are intact.  Patient has

 moist mucous membranes.


NECK: Supple.  Trachea midline


CHEST/LUNGS: Clear to auscultation.  There is no respiratory distress noted.


HEART/CARDIOVASCULAR: Regular.  There is no tachycardia.  There is no gallop rub

 or murmur.


ABDOMEN: Abdomen is soft, nontender.  Patient has normal bowel sounds.  There is

 no abdominal distention.


SKIN: There is no rash.  There is no edema.  There is no diaphoresis.


NEURO: The patient is awake, alert, and oriented.  The patient is cooperative.  

The patient has no focal neurologic deficits.  The patient has normal speech


MUSCULOSKELETAL:There is no evidence of acute injury.





ED Course


                                   Vital Signs











  06/25/21





  01:15


 


Temperature 98 F


 


Pulse Rate 78


 


Respiratory 16





Rate 


 


Blood Pressure 153/78


 


O2 Sat by Pulse 97





Oximetry 














ED Medical Decision Making





- Lab Data


Result diagrams: 


                                 06/25/21 01:49





                                 06/25/21 01:49





                                Laboratory Tests











  06/25/21 06/25/21 06/25/21





  01:49 01:49 05:00


 


WBC  11.0  


 


RBC  4.40  


 


Hgb  14.4  


 


Hct  41.4  


 


MCV  94  


 


MCH  33 H  


 


MCHC  35 H  


 


RDW  14.4  


 


Plt Count  188  


 


Lymph % (Auto)  16.9  


 


Mono % (Auto)  7.4 H  


 


Eos % (Auto)  2.1  


 


Baso % (Auto)  0.8  


 


Lymph # (Auto)  1.9  


 


Mono # (Auto)  0.8  


 


Eos # (Auto)  0.2  


 


Baso # (Auto)  0.1  


 


Seg Neutrophils %  72.8 H  


 


Seg Neutrophils #  8.0 H  


 


D-Dimer   


 


Sodium   137 


 


Potassium   3.9 


 


Chloride   102.1 


 


Carbon Dioxide   28 


 


Anion Gap   11 


 


BUN   6 L 


 


Creatinine   0.7 L 


 


Estimated GFR   > 60 


 


BUN/Creatinine Ratio   9 


 


Glucose   93 


 


Calcium   8.9 


 


Total Bilirubin   0.50 


 


AST   23 


 


ALT   10 


 


Alkaline Phosphatase   95 


 


Troponin T   < 0.010  < 0.010


 


Total Protein   7.0 


 


Albumin   4.0 


 


Albumin/Globulin Ratio   1.3 














  06/25/21 06/25/21





  07:14 07:49


 


WBC  


 


RBC  


 


Hgb  


 


Hct  


 


MCV  


 


MCH  


 


MCHC  


 


RDW  


 


Plt Count  


 


Lymph % (Auto)  


 


Mono % (Auto)  


 


Eos % (Auto)  


 


Baso % (Auto)  


 


Lymph # (Auto)  


 


Mono # (Auto)  


 


Eos # (Auto)  


 


Baso # (Auto)  


 


Seg Neutrophils %  


 


Seg Neutrophils #  


 


D-Dimer   262.48 H


 


Sodium  


 


Potassium  


 


Chloride  


 


Carbon Dioxide  


 


Anion Gap  


 


BUN  


 


Creatinine  


 


Estimated GFR  


 


BUN/Creatinine Ratio  


 


Glucose  


 


Calcium  


 


Total Bilirubin  


 


AST  


 


ALT  


 


Alkaline Phosphatase  


 


Troponin T  < 0.010 


 


Total Protein  


 


Albumin  


 


Albumin/Globulin Ratio  














- EKG Data


-: EKG Interpreted by Me


EKG shows normal: sinus rhythm


Rate: normal





- EKG Data


When compared to previous EKG there are: no significant change


Interpretation: unchanged when compared t





- Radiology Data


Radiology results: report reviewed (Chest x-ray)





- Medical Decision Making





Labs and work-up discussed with patient.  Explained to him that his D-dimer was 

elevated and in order to rule out a pulmonary embolus he needs a CT scan.  Pat

ient verbalized understanding but states he does not wish to have an IV or any 

further work-up.  Patient told should he change his mind return to the hospital 

for further evaluation.  Patient verbalized understanding of increased morbidity

 and/or mortality should he leave the hospital AGAINST MEDICAL ADVICE.  Patient 

is clear and organized





- Differential Diagnosis


ACS, PE, pericarditis, GERD


Critical care attestation.: 


If time is entered above; I have spent that time in minutes in the direct care 

of this critically ill patient, excluding procedure time.








ED Disposition


Clinical Impression: 


 Chest pain





Disposition: DC-07 LEFT AGAINST MED ADVICE


Is pt being admited?: No


Does the pt Need Aspirin: No


Condition: Undetermined


Instructions:  Nonspecific Chest Pain, Adult


Time of Disposition: 09:20 (Patient leaving AMA)





Heart Score





- HEART Score


History: Slightly suspicious


EKG: Non-specific


Age: < 45


Risk factors: No known risk factors


Troponin: < normal limit


HEART Score: 1





- EKG Read Time


Time EKG Completed: 08:08


EKG Read Time: 08:14

## 2021-06-25 NOTE — XRAY REPORT
CHEST 2 VIEWS 



INDICATION / CLINICAL INFORMATION: chest pain.



COMPARISON: None available.



FINDINGS:



SUPPORT DEVICES: None.

HEART / MEDIASTINUM: No significant abnormality. 

LUNGS / PLEURA: Chronic interstitial change and some interstitial nodularity/granulomas change No pne
umothorax. 



ADDITIONAL FINDINGS: No significant additional findings.



IMPRESSION:

1. No acute findings.



Signer Name: Jabier Worthington MD 

Signed: 6/25/2021 3:46 AM

Workstation Name: [id number]-

## 2021-06-30 NOTE — CONSULTATION
History of Present Illness





- Reason for Consult


Consult date: 06/30/21


Reason for consult: Depression





- History of Present Psychiatric Illness


Per ED Note: 40-year-old male presents via EMS complaining of having bad nerves.

 He states that he has felt depressed today.  He states he is homeless and is 

very sad.  He denies suicidal ideation, homicidal ideation, auditory 

hallucinations, or visual hallucinations.  He denies any physical symptoms or 

complaints.  He denies anxiety.  When asked specifically about ulcerations seen 

to his right dorsal foot, he states that these are from his shoes that he has 

been using without socks.  He denies any trauma to his foot, significant pain, 

loss of sensory function, focal weakness, or any other symptoms.





The patient is a 40 year male. During my interview with the patient today, the 

patient was drowsy and was not forthcoming with asked questions. 








PAST PSYCHIATRIC HISTORY:unable to assess








PAST MEDICAL HISTORY: n/a





Family Psychiatric History: None reported or documented





SOCIAL HISTORY: unable to assess





REVIEW OF SYSTEMS


Constitutional: Negative for weight loss


ENT: Negative for stridor


Respiratory: Negative for cough or hemoptysis


All other systems reviewed and are negative


 


MENTAL STATUS EXAMINATION: unable to assess








Diagnoses:





  RECOMMENDATIONS


Patient should be compliant with medications and not to use drugs and not to 

drink alcohol. 


PSYCHOTHERAPY: Supportive psychotherapy provided


MEDICAL: Per primary team


DELIRIUM PRECAUTIONS: Please re-orient patient frequently, keep lights on during

the day, and minimize benzodiazepines and opiates as these medications could 

worsen patient's confusion.


SAFETY SITTER: Per medical team


DISPOSITION: Recommend acute inpatient psychiatric hospitalization at this time


FOLLOW-UP: Will sign follow


Thank you for the consult.  Please contact with any questions and/or concerns.














Medications and Allergies


                                    Allergies











Allergy/AdvReac Type Severity Reaction Status Date / Time


 


divalproex sodium Allergy  Unknown Verified 06/28/20 17:52





[From Depakote]     


 


quetiapine [From Seroquel] Allergy  Unknown Verified 06/28/20 17:52


 


risperidone [From Risperdal] Allergy  Unknown Verified 06/28/20 17:52











                                Home Medications











 Medication  Instructions  Recorded  Confirmed  Last Taken  Type


 


FLUoxetine HCL [FLUoxetine] 20 mg PO DAILY #7 tablet 04/16/20  Unknown Rx


 


OLANzapine [Zyprexa] 10 mg PO BID #14 tablet 04/16/20  Unknown Rx


 


hydrOXYzine PAMOATE [Vistaril] 50 mg PO Q12H #14 capsule 04/16/20  Unknown Rx


 


traZODone [Desyrel] 100 mg PO QHS #7 tablet 04/16/20  Unknown Rx


 


Ondansetron [Zofran Odt] 4 mg PO Q8HR PRN #10 tab.rapdis 06/17/20  Unknown Rx


 


Ondansetron [Zofran Odt] 4 mg PO Q6H #20 tab.rapdis 05/13/21  Unknown Rx


 


cephALEXin [Keflex] 500 mg PO TID #28 cap 06/30/21  Unknown Rx











Active Meds: 


Active Medications





Cephalexin (Cephalexin 500 Mg Cap)  500 mg PO TID [facility]; Protocol


   Stop: 07/09/21 22:01


   Last Admin: 06/30/21 09:00 Dose:  500 mg


   Documented by: 











Mental Status Exam





- Vital signs


                                Last Vital Signs











Temp  98.2 F   06/29/21 20:16


 


Pulse  75   06/29/21 20:16


 


Resp  18   06/29/21 20:16


 


BP  118/52   06/29/21 20:16


 


Pulse Ox  96   06/29/21 20:16














Results


Result Diagrams: 


                                 06/29/21 20:25





                                 06/29/21 20:25


                              Abnormal lab results











  06/29/21 06/29/21 06/29/21 Range/Units





  20:25 20:25 20:25 


 


Mono % (Auto)     (0.0-7.3)  %


 


BUN    8 L  (9-20)  mg/dL


 


Creatinine    0.7 L  (0.8-1.3)  mg/dL


 


Salicylates  < 0.3 L    (2.8-20.0)  mg/dL


 


Acetaminophen   5.0 L   (10.0-30.0)  ug/mL














  06/29/21 Range/Units





  20:25 


 


Mono % (Auto)  8.1 H  (0.0-7.3)  %


 


BUN   (9-20)  mg/dL


 


Creatinine   (0.8-1.3)  mg/dL


 


Salicylates   (2.8-20.0)  mg/dL


 


Acetaminophen   (10.0-30.0)  ug/mL








All other labs normal.

## 2021-06-30 NOTE — EVENT NOTE
Date: 06/30/21





This patient presented overnight with complaint of depression and being 

homeless.  He was placed on an ED hold but did not require a 1013 at that time. 

The patient was medically cleared by my colleague and was placed on some Keflex 

for some foot wound/ulcer.  His vital signs, listed below, have been reassuring 

throughout his ED course thus far including being afebrile.





There are multiple different medications listed for reconciliation, but none yet

have been confirmed for this patient.  His labs have been unremarkable and he 

was medically cleared by the previous physician.  I spoke to the emergency 

department psychiatric nurse, Kathy, who says that she was not signed out any 

events from overnight, nor have there been any this morning.  He has not yet 

been seen by the psychiatric team and they will assist with further disposition.

 We will continue to monitor this patient during his ED course.





                               Vital Signs - 24 hr











  06/29/21





  20:16


 


Temperature 98.2 F


 


Pulse Rate 75


 


Respiratory 18





Rate 


 


Blood Pressure 118/52


 


O2 Sat by Pulse 96





Oximetry

## 2021-06-30 NOTE — EMERGENCY DEPARTMENT REPORT
HPI





<BASHIR MONTENEGRO - Last Filed: 07/01/21 11:12>





- HPI


HPI: 





40-year-old male presents via EMS complaining of having bad nerves.  He states 

that he has felt depressed today.  He states he is homeless and is very sad.  He

denies suicidal ideation, homicidal ideation, auditory hallucinations, or visual

hallucinations.  He denies any physical symptoms or complaints.  He denies 

anxiety.  When asked specifically about ulcerations seen to his right dorsal 

foot, he states that these are from his shoes that he has been using without 

socks.  He denies any trauma to his foot, significant pain, loss of sensory 

function, focal weakness, or any other symptoms.





<MAL HENDRICKS - Last Filed: 07/02/21 09:45>





- General


Chief Complaint: Psych


Time Seen by Provider: 06/29/21 20:54





ED Past Medical Hx





<BASHIR MONTENEGRO - Last Filed: 07/01/21 11:12>





- Past Medical History


Previous Medical History?: Yes


Hx Psychiatric Treatment: Yes (bipolar, schizophrenia)





- Social History


Smoking Status: Never Smoker


Substance Use Type: None (Denies illicit drug use)





<MAL HENDRICKS - Last Filed: 07/02/21 09:45>





- Medications


Home Medications: 


                                Home Medications











 Medication  Instructions  Recorded  Confirmed  Last Taken  Type


 


FLUoxetine HCL [FLUoxetine] 20 mg PO DAILY #7 tablet 04/16/20  Unknown Rx


 


OLANzapine [Zyprexa] 10 mg PO BID #14 tablet 04/16/20  Unknown Rx


 


hydrOXYzine PAMOATE [Vistaril] 50 mg PO Q12H #14 capsule 04/16/20  Unknown Rx


 


traZODone [Desyrel] 100 mg PO QHS #7 tablet 04/16/20  Unknown Rx


 


Ondansetron [Zofran Odt] 4 mg PO Q8HR PRN #10 tab.rapdis 06/17/20  Unknown Rx


 


Ondansetron [Zofran Odt] 4 mg PO Q6H #20 tab.rapdis 05/13/21  Unknown Rx


 


cephALEXin [Keflex] 500 mg PO TID #28 cap 06/30/21  Unknown Rx


 


FLUoxetine HCL [Prozac] 20 mg PO DAILY 30 Days #30 capsule 07/01/21  Unknown Rx


 


traZODone [Desyrel] 50 mg PO QHS 30 Days #30 tab 07/01/21  Unknown Rx














ED Review of Systems


ROS: 


Stated complaint: NERVES


Other details as noted in HPI








<BASHIR MONTENEGRO - Last Filed: 07/01/21 11:12>


ROS: 


Stated complaint: NERVES


Other details as noted in HPI





Constitutional: denies: chills, fever


Eyes: denies: eye pain, vision change


ENT: denies: throat pain, congestion


Respiratory: denies: cough, shortness of breath


Cardiovascular: denies: chest pain, palpitations


Gastrointestinal: denies: abdominal pain, nausea, vomiting


Musculoskeletal: denies: back pain, myalgia


Skin: other (ulcers to right dorsal foot)


Neurological: denies: headache, weakness, numbness





<MAL HENDRICKS - Last Filed: 07/02/21 09:45>





Physical Exam





- Physical Exam


Vital Signs: 


                                   Vital Signs











  06/29/21 06/30/21 06/30/21





  20:16 20:00 20:30


 


Temperature 98.2 F  97.8 F


 


Pulse Rate 75  65


 


Respiratory 18 18 18





Rate   


 


Blood Pressure 118/52  


 


Blood Pressure   100/58





[Left]   


 


O2 Sat by Pulse 96 96 96





Oximetry   














  07/01/21 07/01/21





  02:30 08:35


 


Temperature 98.0 F 98.0 F


 


Pulse Rate 64 70


 


Respiratory 18 16





Rate  


 


Blood Pressure  


 


Blood Pressure 102/60 104/48





[Left]  


 


O2 Sat by Pulse 100 99





Oximetry  














<BASHIR MONTENEGRO - Last Filed: 07/01/21 11:12>





- Physical Exam


Vital Signs: 


                                   Vital Signs











  06/29/21





  20:16


 


Temperature 98.2 F


 


Pulse Rate 75


 


Respiratory 18





Rate 


 


Blood Pressure 118/52


 


O2 Sat by Pulse 96





Oximetry 











Physical Exam: 





GENERAL: Disheveled, poorly kempt male with visible dirt on his body.  He is in 

no acute distress


HEENT: Normocephalic. No obvious signs of trauma. Moist mucous membranes.


EYES: Extraocular movements are intact. 


NECK: Trachea is midline.


LUNGS: Nonlabored breathing. Equal chest rise bilaterally. Clear to auscultation

 bilaterally.


HEART/CARDIOVASCULAR: Regular rate and rhythm. No murmurs or rubs.


VASCULAR: Cap refill < 2 seconds


ABDOMEN: Abdomen is soft and nondistended. There is no significant tenderness, 

guarding or rebound.


SKIN: Skin is warm and dry.  There are multiple scattered ulcerations noted to 

the dorsum of the right foot with surrounding warmth and erythema.  There is no 

fluctuance.


NEURO: Patient is awake, alert, and oriented. CNs II-XII grossly intact. No 

focal deficits. Normal motor and sensory exam throughout. Normal speech. Normal 

gait. 


MUSCULOSKELETAL: No obvious deformities. No significant tenderness. Normal ROM 

throughout.  As noted in the skin exam, there are ulcerations noted to the 

dorsum of the right foot with surrounding warmth and erythema.  There is no bony

 tenderness.  There is normal range of motion at the right ankle.  There is no 

malleolar tenderness.  There is no tenderness of the base of the fifth 

metatarsal.  Both feet have extensive onychomycosis.








<MAL HENDRICKS - Last Filed: 07/02/21 09:45>





ED Course


                                   Vital Signs











  06/29/21 06/30/21 06/30/21





  20:16 20:00 20:30


 


Temperature 98.2 F  97.8 F


 


Pulse Rate 75  65


 


Respiratory 18 18 18





Rate   


 


Blood Pressure 118/52  


 


Blood Pressure   100/58





[Left]   


 


O2 Sat by Pulse 96 96 96





Oximetry   














  07/01/21 07/01/21





  02:30 08:35


 


Temperature 98.0 F 98.0 F


 


Pulse Rate 64 70


 


Respiratory 18 16





Rate  


 


Blood Pressure  


 


Blood Pressure 102/60 104/48





[Left]  


 


O2 Sat by Pulse 100 99





Oximetry  














<BASHIR MONTENEGRO - Last Filed: 07/01/21 11:12>


                                   Vital Signs











  06/29/21





  20:16


 


Temperature 98.2 F


 


Pulse Rate 75


 


Respiratory 18





Rate 


 


Blood Pressure 118/52


 


O2 Sat by Pulse 96





Oximetry 














<MAL HENDRICKS - Last Filed: 07/02/21 09:45>





ED Medical Decision Making





- Lab Data


Result diagrams: 


                                 06/29/21 20:25





                                 06/29/21 20:25





- Medical Decision Making





Patient has been evaluated by our psychiatric team and advised to discharge 

patient and follow-up as an outpatient.  Patient is currently denying any 

suicidal or homicidal ideation.  Patient also denied any visual or auditory 

hallucination.  Patient is medically and psychiatrically stable for discharge.





<BASHIR MONTENEGRO - Last Filed: 07/01/21 11:12>





- Lab Data


Result diagrams: 


                                 06/29/21 20:25





                                 06/29/21 20:25





                                   Lab Results











  06/29/21 06/29/21 06/29/21 Range/Units





  20:25 20:25 20:25 


 


WBC     (4.5-11.0)  K/mm3


 


RBC     (3.65-5.03)  M/mm3


 


Hgb     (11.8-15.2)  gm/dl


 


Hct     (35.5-45.6)  %


 


MCV     (84-94)  fl


 


MCH     (28-32)  pg


 


MCHC     (32-34)  %


 


RDW     (13.2-15.2)  %


 


Plt Count     (140-440)  K/mm3


 


Lymph % (Auto)     (13.4-35.0)  %


 


Mono % (Auto)     (0.0-7.3)  %


 


Eos % (Auto)     (0.0-4.3)  %


 


Baso % (Auto)     (0.0-1.8)  %


 


Lymph # (Auto)     (1.2-5.4)  K/mm3


 


Mono # (Auto)     (0.0-0.8)  K/mm3


 


Eos # (Auto)     (0.0-0.4)  K/mm3


 


Baso # (Auto)     (0.0-0.1)  K/mm3


 


Seg Neutrophils %     (40.0-70.0)  %


 


Seg Neutrophils #     (1.8-7.7)  K/mm3


 


Sodium    138  (137-145)  mmol/L


 


Potassium    3.7  (3.6-5.0)  mmol/L


 


Chloride    101.5  ()  mmol/L


 


Carbon Dioxide    26  (22-30)  mmol/L


 


Anion Gap    14  mmol/L


 


BUN    8 L  (9-20)  mg/dL


 


Creatinine    0.7 L  (0.8-1.3)  mg/dL


 


Estimated GFR    > 60  ml/min


 


BUN/Creatinine Ratio    11  %


 


Glucose    92  ()  mg/dL


 


Calcium    9.1  (8.4-10.2)  mg/dL


 


Urine Color     (Yellow)  


 


Urine Turbidity     (Clear)  


 


Urine pH     (5.0-7.0)  


 


Ur Specific Gravity     (1.003-1.030)  


 


Urine Protein     (Negative)  mg/dL


 


Urine Glucose (UA)     (Negative)  mg/dL


 


Urine Ketones     (Negative)  mg/dL


 


Urine Blood     (Negative)  


 


Urine Nitrite     (Negative)  


 


Urine Bilirubin     (Negative)  


 


Urine Urobilinogen     (<2.0)  mg/dL


 


Ur Leukocyte Esterase     (Negative)  


 


Urine WBC (Auto)     (0.0-6.0)  /HPF


 


Urine RBC (Auto)     (0.0-6.0)  /HPF


 


Urine Mucus     /HPF


 


Salicylates  < 0.3 L    (2.8-20.0)  mg/dL


 


Urine Opiates Screen     


 


Urine Methadone Screen     


 


Acetaminophen   5.0 L   (10.0-30.0)  ug/mL


 


Ur Barbiturates Screen     


 


Ur Phencyclidine Scrn     


 


Ur Amphetamines Screen     


 


U Benzodiazepines Scrn     


 


Urine Cocaine Screen     


 


U Marijuana (THC) Screen     


 


Drugs of Abuse Note     


 


Plasma/Serum Alcohol     (0-0.07)  %


 


Coronavirus (PCR)     (Negative)  














  06/29/21 06/29/21 06/29/21 Range/Units





  20:25 20:25 Unknown 


 


WBC   7.8   (4.5-11.0)  K/mm3


 


RBC   4.46   (3.65-5.03)  M/mm3


 


Hgb   14.4   (11.8-15.2)  gm/dl


 


Hct   41.9   (35.5-45.6)  %


 


MCV   94   (84-94)  fl


 


MCH   32   (28-32)  pg


 


MCHC   34   (32-34)  %


 


RDW   14.5   (13.2-15.2)  %


 


Plt Count   217   (140-440)  K/mm3


 


Lymph % (Auto)   31.9   (13.4-35.0)  %


 


Mono % (Auto)   8.1 H   (0.0-7.3)  %


 


Eos % (Auto)   3.9   (0.0-4.3)  %


 


Baso % (Auto)   0.8   (0.0-1.8)  %


 


Lymph # (Auto)   2.5   (1.2-5.4)  K/mm3


 


Mono # (Auto)   0.6   (0.0-0.8)  K/mm3


 


Eos # (Auto)   0.3   (0.0-0.4)  K/mm3


 


Baso # (Auto)   0.1   (0.0-0.1)  K/mm3


 


Seg Neutrophils %   55.3   (40.0-70.0)  %


 


Seg Neutrophils #   4.3   (1.8-7.7)  K/mm3


 


Sodium     (137-145)  mmol/L


 


Potassium     (3.6-5.0)  mmol/L


 


Chloride     ()  mmol/L


 


Carbon Dioxide     (22-30)  mmol/L


 


Anion Gap     mmol/L


 


BUN     (9-20)  mg/dL


 


Creatinine     (0.8-1.3)  mg/dL


 


Estimated GFR     ml/min


 


BUN/Creatinine Ratio     %


 


Glucose     ()  mg/dL


 


Calcium     (8.4-10.2)  mg/dL


 


Urine Color    Yellow  (Yellow)  


 


Urine Turbidity    Clear  (Clear)  


 


Urine pH    7.0  (5.0-7.0)  


 


Ur Specific Gravity    1.009  (1.003-1.030)  


 


Urine Protein    <15 mg/dl  (Negative)  mg/dL


 


Urine Glucose (UA)    Neg  (Negative)  mg/dL


 


Urine Ketones    Neg  (Negative)  mg/dL


 


Urine Blood    Neg  (Negative)  


 


Urine Nitrite    Neg  (Negative)  


 


Urine Bilirubin    Neg  (Negative)  


 


Urine Urobilinogen    < 2.0  (<2.0)  mg/dL


 


Ur Leukocyte Esterase    Neg  (Negative)  


 


Urine WBC (Auto)    < 1.0  (0.0-6.0)  /HPF


 


Urine RBC (Auto)    1.0  (0.0-6.0)  /HPF


 


Urine Mucus    Few  /HPF


 


Salicylates     (2.8-20.0)  mg/dL


 


Urine Opiates Screen     


 


Urine Methadone Screen     


 


Acetaminophen     (10.0-30.0)  ug/mL


 


Ur Barbiturates Screen     


 


Ur Phencyclidine Scrn     


 


Ur Amphetamines Screen     


 


U Benzodiazepines Scrn     


 


Urine Cocaine Screen     


 


U Marijuana (THC) Screen     


 


Drugs of Abuse Note     


 


Plasma/Serum Alcohol  < 0.01    (0-0.07)  %


 


Coronavirus (PCR)     (Negative)  














  06/29/21 06/30/21 Range/Units





  Unknown Unknown 


 


WBC    (4.5-11.0)  K/mm3


 


RBC    (3.65-5.03)  M/mm3


 


Hgb    (11.8-15.2)  gm/dl


 


Hct    (35.5-45.6)  %


 


MCV    (84-94)  fl


 


MCH    (28-32)  pg


 


MCHC    (32-34)  %


 


RDW    (13.2-15.2)  %


 


Plt Count    (140-440)  K/mm3


 


Lymph % (Auto)    (13.4-35.0)  %


 


Mono % (Auto)    (0.0-7.3)  %


 


Eos % (Auto)    (0.0-4.3)  %


 


Baso % (Auto)    (0.0-1.8)  %


 


Lymph # (Auto)    (1.2-5.4)  K/mm3


 


Mono # (Auto)    (0.0-0.8)  K/mm3


 


Eos # (Auto)    (0.0-0.4)  K/mm3


 


Baso # (Auto)    (0.0-0.1)  K/mm3


 


Seg Neutrophils %    (40.0-70.0)  %


 


Seg Neutrophils #    (1.8-7.7)  K/mm3


 


Sodium    (137-145)  mmol/L


 


Potassium    (3.6-5.0)  mmol/L


 


Chloride    ()  mmol/L


 


Carbon Dioxide    (22-30)  mmol/L


 


Anion Gap    mmol/L


 


BUN    (9-20)  mg/dL


 


Creatinine    (0.8-1.3)  mg/dL


 


Estimated GFR    ml/min


 


BUN/Creatinine Ratio    %


 


Glucose    ()  mg/dL


 


Calcium    (8.4-10.2)  mg/dL


 


Urine Color    (Yellow)  


 


Urine Turbidity    (Clear)  


 


Urine pH    (5.0-7.0)  


 


Ur Specific Gravity    (1.003-1.030)  


 


Urine Protein    (Negative)  mg/dL


 


Urine Glucose (UA)    (Negative)  mg/dL


 


Urine Ketones    (Negative)  mg/dL


 


Urine Blood    (Negative)  


 


Urine Nitrite    (Negative)  


 


Urine Bilirubin    (Negative)  


 


Urine Urobilinogen    (<2.0)  mg/dL


 


Ur Leukocyte Esterase    (Negative)  


 


Urine WBC (Auto)    (0.0-6.0)  /HPF


 


Urine RBC (Auto)    (0.0-6.0)  /HPF


 


Urine Mucus    /HPF


 


Salicylates    (2.8-20.0)  mg/dL


 


Urine Opiates Screen  Negative   


 


Urine Methadone Screen  Negative   


 


Acetaminophen    (10.0-30.0)  ug/mL


 


Ur Barbiturates Screen  Negative   


 


Ur Phencyclidine Scrn  Negative   


 


Ur Amphetamines Screen  Negative   


 


U Benzodiazepines Scrn  Negative   


 


Urine Cocaine Screen  Negative   


 


U Marijuana (THC) Screen  Negative   


 


Drugs of Abuse Note  Disclamer   


 


Plasma/Serum Alcohol    (0-0.07)  %


 


Coronavirus (PCR)   Negative  (Negative)  














- Medical Decision Making





40-year-old male with history of homelessness presents complaining of depression

 today.  Denies SI/HI, auditory hallucinations, or visual hallucinations.  

Although the patient has no physical complaints, physical examination reveals 

multiple scattered ulcerations to the dorsum of his right foot which the patient

 attributes to using shoes without socks.  He does have surrounding erythema and

 warmth concerning for cellulitis.  There is no joint involvement.  There is no 

bony tenderness.  There is no history of trauma.  Range of motion is intact.  He

 has normal 2+ pedal pulses bilaterally.  Will plan to treat right foot 

cellulitis with Keflex 500 mg 3 times daily x10 days.  The patient will need to 

follow-up with a doctor. 





 Although the patient does not report SI/HI or hallucinations, given that he 

does have a psychiatric history and will require outpatient resources I will 

consult mental health to provide further recommendations.





Patient seen by mental health  in conjunction with psychiatry and 

prescribed Prosac and Trazodone. Given outpatient resources for follow up.





<MAL HENDRICKS - Last Filed: 07/02/21 09:45>


Critical care attestation.: 


If time is entered above; I have spent that time in minutes in the direct care 

of this critically ill patient, excluding procedure time.








<BASHIR MONTENEGRO - Last Filed: 07/01/21 11:12>


Critical care attestation.: 


If time is entered above; I have spent that time in minutes in the direct care 

of this critically ill patient, excluding procedure time.








<MAL HENDRICKS - Last Filed: 07/02/21 09:45>





ED Disposition


Is pt being admited?: No





<BASHIR MONTENEGRO - Last Filed: 07/01/21 11:12>


Is pt being admited?: No





<MAL HENDRICKS - Last Filed: 07/02/21 09:45>


Clinical Impression: 


 Depression, Cellulitis of right foot





Disposition: DC-01 TO HOME OR SELFCARE


Condition: Stable


Instructions:  Cellulitis, Adult, Living With Depression


Additional Instructions: 


Please follow-up with primary care doctor or podiatrist within a few days for 

your right foot cellulitis.  Please return to the emergency department should 

you develop worsening symptoms, high fever or any other concerns











Professional and Agency Contacts To help Resolve Crises(24/7)


[place] Crisis Line: 1-854.497.8386


Suicide Prevention Line: 1-117.234.4067


Crisis Text Line: Text START to 453877


Emergency: 911





Outpatient [facility] Resources:





HARLEEN: [facility]


[address], [place] [address] 


Phone: [phone]





[facility]


[address] [place] [address]


Phone: [phone]





ZARINA:





[facility] -


[address] [place] [address]


Phone: [phone]


Monday thru Friday - 8am - 5pm





LEONARD:


[facility]


Address: [address], [place] [address]


Phone: [phone]





BARBER:


[facility]


Address: [address] [place] [address]


Monday thru Friday- 7am-2pm


Phone: [phone]





[facility]


Address: [address] [place] [address]


Monday thru Friday: 8:30AM-5PM


Phone: [phone]


Prescriptions: 


traZODone [Desyrel] 50 mg PO QHS 30 Days #30 tab


cephALEXin [Keflex] 500 mg PO TID #28 cap


FLUoxetine HCL [Prozac] 20 mg PO DAILY 30 Days #30 capsule


Referrals: 


[facility] [Provider Group] - 2-3 Days


SUE SMITH MD [Staff Physician] - 3-5 Days

## 2021-07-01 NOTE — PROGRESS NOTE
Subjective





- Reason for Consult


Consult date: 07/01/21


Reason for consult: Depression





- Chief Complaint


Chief complaint: 


Patient was seen this morning resting in bed. Patient reports doing well but 

complained about his foot " I need some pain killer." Patient reports depression

is improving. He reports sleep and appetite as good. He denies any current 

suicidal homicidal ideation and denies hallucinations.








REVIEW OF SYSTEMS


Constitutional: Negative for weight loss


ENT: Negative for stridor


Respiratory: Negative for cough or hemoptysis


All other systems reviewed and are negative


 


MENTAL STATUS EXAMINATION


General Appearance and Behavior: Age appropriate, good hygiene, wearing 

appropriate clothes, uncooperative polite with questioning.


Cooperation: cooperative


Psychomotor Behavior: Psychomotor agitation


Mood: good


Affect and affective range: Congruent


Thought Process: Self directed


Thought Content:No suicidal ideation


Speech: Normal


Intellectual Functioning: Average


Suicidal Ideation: Denied


Homicidal Ideation: Denied


hallucination: Denies


Impulse Control: intact


Insight and Judgment:  limited


Memory: Normal


Attention:Normal


Orientation: Alert and oriented





( 1) Diagnoses: Major depressive Disorder, Recurrent, Moderate


F33.1








Current Visit: Yes   Status: Acute   








RECOMMENDATIONS


DC 1013


Start Prozac 20mg po Daily


Start Trazodone 50mg po QHS





Risks, benefits and alternatives of medications discussed with the patient, 

questions answered and consent obtained from patient.


PSYCHOTHERAPY: Supportive psychotherapy provided


MEDICAL: Per primary team


DELIRIUM PRECAUTIONS: Please re-orient patient frequently, keep lights on during

the day, and minimize benzodiazepines and opiates as these medications could 

worsen patient's confusion.


SAFETY SITTER: Per medical team


DISPOSITION: Do not recommend acute inpatient psychiatric hospitalization at 

this time


FOLLOW-UP: Will sign off


The patient understands that if suicidal ideas, homicidal ideas, or any 

endangering thoughts/behaviors arise, they should immediately seek emergent 

assistance including but not limited to crisis hotline and emergency room. 

Follow up with out patient  within 7 days or sooner. 


 











Mental Status Exam





- Vital signs


                                Last Vital Signs











Temp  98.0 F   07/01/21 08:35


 


Pulse  70   07/01/21 08:35


 


Resp  16   07/01/21 08:35


 


BP  104/48   07/01/21 08:35


 


Pulse Ox  99   07/01/21 08:35

## 2021-07-04 NOTE — EMERGENCY DEPARTMENT REPORT
ED General Adult HPI





- General


Chief complaint: Psych


Stated complaint: nausea


Time Seen by Provider: 07/04/21 03:25


Source: patient


Mode of arrival: Ambulatory


Limitations: No Limitations





- History of Present Illness


Initial comments: 





40-year-old male presents emergency department plaint of nausea.  He denies any 

abdominal pain.  He has been eating okay.  Patient does have a history of 

schizophrenia and makes frequent visits to the emergency department.





- Related Data


                                  Previous Rx's











 Medication  Instructions  Recorded  Last Taken  Type


 


FLUoxetine HCL [FLUoxetine] 20 mg PO DAILY #7 tablet 04/16/20 Unknown Rx


 


OLANzapine [Zyprexa] 10 mg PO BID #14 tablet 04/16/20 Unknown Rx


 


hydrOXYzine PAMOATE [Vistaril] 50 mg PO Q12H #14 capsule 04/16/20 Unknown Rx


 


traZODone [Desyrel] 100 mg PO QHS #7 tablet 04/16/20 Unknown Rx


 


Ondansetron [Zofran Odt] 4 mg PO Q8HR PRN #10 tab.rapdis 06/17/20 Unknown Rx


 


Ondansetron [Zofran Odt] 4 mg PO Q6H #20 tab.rapdis 05/13/21 Unknown Rx


 


cephALEXin [Keflex] 500 mg PO TID #28 cap 06/30/21 Unknown Rx


 


FLUoxetine HCL [Prozac] 20 mg PO DAILY 30 Days #30 capsule 07/01/21 Unknown Rx


 


traZODone [Desyrel] 50 mg PO QHS 30 Days #30 tab 07/01/21 Unknown Rx











                                    Allergies











Allergy/AdvReac Type Severity Reaction Status Date / Time


 


divalproex sodium Allergy  Unknown Verified 06/28/20 17:52





[From Depakote]     


 


quetiapine [From Seroquel] Allergy  Unknown Verified 06/28/20 17:52


 


risperidone [From Risperdal] Allergy  Unknown Verified 06/28/20 17:52














ED Review of Systems


ROS: 


Stated complaint: CHEST PAIN


Other details as noted in HPI





Constitutional: denies: chills, fever


Eyes: denies: eye discharge


ENT: denies: dental pain


Respiratory: denies: shortness of breath


Cardiovascular: denies: chest pain


Endocrine: no symptoms reported


Gastrointestinal: nausea.  denies: vomiting


Genitourinary: frequency


Musculoskeletal: denies: back pain


Skin: denies: rash


Neurological: denies: headache


Psychiatric: denies: anxiety, depression


Hematological/Lymphatic: denies: easy bleeding





ED Past Medical Hx





- Past Medical History


Previous Medical History?: Yes


Hx Psychiatric Treatment: Yes (bipolar, schizophrenia)





- Surgical History


Past Surgical History?: No





- Social History


Smoking Status: Never Smoker


Substance Use Type: None (Denies illicit drug use)





- Medications


Home Medications: 


                                Home Medications











 Medication  Instructions  Recorded  Confirmed  Last Taken  Type


 


FLUoxetine HCL [FLUoxetine] 20 mg PO DAILY #7 tablet 04/16/20  Unknown Rx


 


OLANzapine [Zyprexa] 10 mg PO BID #14 tablet 04/16/20  Unknown Rx


 


hydrOXYzine PAMOATE [Vistaril] 50 mg PO Q12H #14 capsule 04/16/20  Unknown Rx


 


traZODone [Desyrel] 100 mg PO QHS #7 tablet 04/16/20  Unknown Rx


 


Ondansetron [Zofran Odt] 4 mg PO Q8HR PRN #10 tab.rapdis 06/17/20  Unknown Rx


 


Ondansetron [Zofran Odt] 4 mg PO Q6H #20 tab.rapdis 05/13/21  Unknown Rx


 


cephALEXin [Keflex] 500 mg PO TID #28 cap 06/30/21  Unknown Rx


 


FLUoxetine HCL [Prozac] 20 mg PO DAILY 30 Days #30 capsule 07/01/21  Unknown Rx


 


traZODone [Desyrel] 50 mg PO QHS 30 Days #30 tab 07/01/21  Unknown Rx














ED Physical Exam





- General


Limitations: No Limitations


General appearance: alert, in no apparent distress





- Head


Head exam: Present: atraumatic, normocephalic





- Eye


Eye exam: Present: normal appearance


Pupils: Present: normal accommodation





- ENT


ENT exam: Present: normal exam





- Neck


Neck exam: Present: normal inspection





- Respiratory


Respiratory exam: Present: normal lung sounds bilaterally





- Cardiovascular


Cardiovascular Exam: Present: regular rate, normal rhythm, normal heart sounds





- GI/Abdominal


GI/Abdominal exam: Present: soft.  Absent: distended, tenderness





- Rectal


Rectal exam: Present: deferred





- Extremities Exam


Extremities exam: Present: normal inspection





- Back Exam


Back exam: Present: normal inspection





- Neurological Exam


Neurological exam: Present: alert





- Psychiatric


Psychiatric exam: Present: normal affect





- Skin


Skin exam: Present: warm, dry, intact





ED Course





                                   Vital Signs











  07/04/21





  02:13


 


Temperature 98.0 F


 


Pulse Rate 67


 


Respiratory 16





Rate 


 


Blood Pressure 108/47


 


O2 Sat by Pulse 98





Oximetry 














ED Medical Decision Making





- Medical Decision Making





40-year-old male presents to emergency department complaint of nausea.  

Abdominal exam is benign.  Patient is tolerating p.o. in the emergency 

department.  Plan for patient to be discharged with follow-up with primary care.


Critical care attestation.: 


If time is entered above; I have spent that time in minutes in the direct care 

of this critically ill patient, excluding procedure time.








ED Disposition


Clinical Impression: 


 Nausea





Disposition: DC-01 TO HOME OR SELFCARE


Is pt being admited?: No


Does the pt Need Aspirin: No


Condition: Stable


Instructions:  Nausea, Adult


Time of Disposition: 03:48

## 2021-10-14 NOTE — CONSULTATION
History of Present Illness





- Reason for Consult


Consult date: 10/14/21


Reason for consult: anxiety





- History of Present Psychiatric Illness


The patient was seen today. He is calm and cooperative. He is sleeping but 

easily arouses. He is vague in describing what's going on with him. He is a 

42y/o male patient who states he came in for his "nerves." He says "I'm anxious

." When asking the patient why, he said "I don't know." He says "I need 

something for my nerves." He denies any fear or feeling of endangerment. The 

patient denies SI/HI. He also denies hallucinations of any kind. He denies being

on any psychiatric medications. He denies any illicit drug use, alcohol or 

nicotine. 





PAST PSYCHIATRIC HISTORY:


Diagnoses: schizophrenia


Suicide attempts or Self-harm behavior: Denies


Prior psychiatric hospitalizations: Yes


Substance Abuse history: Denies


Previous psychiatric medications tried: Denies


Outpatient treatment: Denies





PAST MEDICAL HISTORY: None reported or document





Family Psychiatric History: None reported or documented





SOCIAL HISTORY


Marital Status: Single 


Living Arrangements: "lives at a house"


Employment Status: Unemployed


Access to guns/weapons: Denies


Education: 


History of Abuse: Denies


Legal History: unknown





REVIEW OF SYSTEMS  


Constitutional: Negative for weight loss


ENT: Negative for stridor


Respiratory: Negative for cough or hemoptysis


All other systems reviewed and are negative





MENTAL STATUS EXAMINATION


General Appearance and Behavior: Age appropriate, good hygiene, wearing shelley

ropriate clothes. calm, cooperative


Cooperation: Cooperative


Psychomotor Behavior: Psychomotor normal


Mood: anxious


Affect and affective range: Congruent with stated mood


Thought Process: circumstantial


Thought Content: None


Speech: normal tone and pace


Suicidal Ideation: Denies


Homicidal Ideation: Denies


Hallucinations: Denies


Delusions: None elicited


Impulse Control: Normal


Insight and Judgment: Limited


Memory: Limited


Attention: attentive


Orientation: a/o x 3





 Assessment 


(1) Mental Health Evaluation


Current Visit: Yes   Status: Acute





Treatment Plan


No scripts given at this time


The patient to comply with previously prescribed medications


Risks, benefits and alternatives of medications discussed with the patient, 

questions answered and consent obtained from patient.


PSYCHOTHERAPY: Supportive psychotherapy provided


MEDICAL: Per primary team


DELIRIUM PRECAUTIONS: Please re-orient patient frequently, keep lights on during

the day, and minimize benzodiazepines and opiates as these medications could 

worsen patient's confusion.


SAFETY SITTER: Defer to primary


DISPOSITION: Do not recommend acute psychiatric inpatient treatment. The patient

understands that if SI/HI or fear of endangerment are to arise he is to seek 

immediate assistance. 


The  to further discuss safety plan


The  to give the patient all necessary outpatient resources 


FOLLOW-UP: sign off. Thanks


Case staffed with Dr. Zaragoza











Medications and Allergies


                                    Allergies











Allergy/AdvReac Type Severity Reaction Status Date / Time


 


divalproex sodium Allergy  Unknown Verified 10/14/21 04:28





[From Depakote]     


 


quetiapine [From Seroquel] Allergy  Unknown Verified 10/14/21 04:28


 


risperidone [From Risperdal] Allergy  Unknown Verified 10/14/21 04:28











                                Home Medications











 Medication  Instructions  Recorded  Confirmed  Last Taken  Type


 


FLUoxetine HCL [FLUoxetine] 20 mg PO DAILY #7 tablet 04/16/20  Unknown Rx


 


OLANzapine [Zyprexa] 10 mg PO BID #14 tablet 04/16/20  Unknown Rx


 


hydrOXYzine PAMOATE [Vistaril] 50 mg PO Q12H #14 capsule 04/16/20  Unknown Rx


 


traZODone [Desyrel] 100 mg PO QHS #7 tablet 04/16/20  Unknown Rx


 


Ondansetron [Zofran Odt] 4 mg PO Q8HR PRN #10 tab.rapdis 06/17/20  Unknown Rx


 


Ondansetron [Zofran Odt] 4 mg PO Q6H #20 tab.rapdis 05/13/21  Unknown Rx


 


cephALEXin [Keflex] 500 mg PO TID #28 cap 06/30/21  Unknown Rx


 


FLUoxetine HCL [Prozac] 20 mg PO DAILY 30 Days #30 capsule 07/01/21  Unknown Rx


 


traZODone [Desyrel] 50 mg PO QHS 30 Days #30 tab 07/01/21  Unknown Rx














Mental Status Exam





- Vital signs


                                Last Vital Signs











Temp  98.9 F   10/14/21 07:42


 


Pulse  74   10/14/21 07:42


 


Resp  18   10/14/21 07:42


 


BP  110/68   10/14/21 07:42


 


Pulse Ox  96   10/14/21 07:42














Results


Result Diagrams: 


                                 10/14/21 06:00





                                 10/14/21 06:00


                              Abnormal lab results











  10/14/21 10/14/21 Range/Units





  06:00 06:00 


 


MCHC  35 H   (32-34)  %


 


Mono % (Auto)  8.6 H   (0.0-7.3)  %


 


BUN   8 L  (9-20)  mg/dL


 


Creatinine   0.7 L  (0.8-1.3)  mg/dL


 


Glucose   115 H  ()  mg/dL








All other labs normal.

## 2021-10-14 NOTE — EMERGENCY DEPARTMENT REPORT
HPI





- General


Chief Complaint: Psych


Time Seen by Provider: 10/14/21 06:02





- HPI


HPI: 





41-year-old male presents to the emergency department with complaint of "I am 

having a problem with my nerves."  I asked if the patient is feeling anxious and

the patient says "no, I have schizophrenia."  He denies any current auditory or 

visual hallucinations.  He denies any suicidal or homicidal ideations.  When 

asked what brought him to the emergency department, the patient says "I just do 

not feel well."  He cannot describe as to what not feeling well means in terms 

of any other symptoms.  He denies any fever, cough, shortness of breath, chest 

pain, abdominal pain.  The patient requests a psychiatric evaluation, but also 

requests case management for assistance in placement to "a [facility] house or a 

homeless shelter."  He denies any tobacco, illicit drug, or alcohol use/abuse.





ED Past Medical Hx





- Past Medical History


Hx Psychiatric Treatment: Yes (bipolar, schizophrenia)





- Social History


Smoking Status: Former Smoker


Substance Use Type: None





- Medications


Home Medications: 


                                Home Medications











 Medication  Instructions  Recorded  Confirmed  Last Taken  Type


 


FLUoxetine HCL [FLUoxetine] 20 mg PO DAILY #7 tablet 20  Unknown Rx


 


OLANzapine [Zyprexa] 10 mg PO BID #14 tablet 20  Unknown Rx


 


hydrOXYzine PAMOATE [Vistaril] 50 mg PO Q12H #14 capsule 20  Unknown Rx


 


traZODone [Desyrel] 100 mg PO QHS #7 tablet 20  Unknown Rx


 


Ondansetron [Zofran Odt] 4 mg PO Q8HR PRN #10 tab.rapdis 20  Unknown Rx


 


Ondansetron [Zofran Odt] 4 mg PO Q6H #20 tab.rapdis 21  Unknown Rx


 


cephALEXin [Keflex] 500 mg PO TID #28 cap 21  Unknown Rx


 


FLUoxetine HCL [Prozac] 20 mg PO DAILY 30 Days #30 capsule 21  Unknown Rx


 


traZODone [Desyrel] 50 mg PO QHS 30 Days #30 tab 21  Unknown Rx














ED Review of Systems


ROS: 


Stated complaint: MH EVAL


Other details as noted in HPI





Comment: All other systems reviewed and negative


Constitutional: denies: chills, fever


Eyes: denies: eye pain, vision change


ENT: denies: ear pain, throat pain


Respiratory: denies: cough, shortness of breath


Cardiovascular: denies: chest pain, palpitations


Gastrointestinal: denies: abdominal pain, vomiting


Genitourinary: denies: dysuria, discharge


Musculoskeletal: denies: back pain, arthralgia


Skin: denies: rash, lesions


Neurological: denies: headache, weakness


Psychiatric: denies: auditory hallucinations, visual hallucinations, homicidal 

thoughts, suicidal thoughts





Physical Exam





- Physical Exam


Vital Signs: 


                                   Vital Signs











  10/14/21 10/14/21





  04:29 04:47


 


Temperature 98.9 F 98.9 F


 


Pulse Rate 80 76


 


Respiratory 18 18





Rate  


 


Blood Pressure  107/61


 


Blood Pressure 107/61 107/61





[Right]  


 


O2 Sat by Pulse  98





Oximetry  











Physical Exam: 





GENERAL: The patient is well-developed well-nourished.


HENT: Normocephalic.  Atraumatic.    Patient has moist mucous membranes.


EYES: Extraocular motions are intact. 


NECK: Supple. Trachea is midline.


CHEST/LUNGS: Clear to auscultation.  There is no respiratory distress noted.


HEART/CARDIOVASCULAR: Regular.  There is no tachycardia.  There is no murmur.


ABDOMEN: Abdomen is soft, nontender.  Patient has normal bowel sounds.  


SKIN: Skin is warm and dry. 


NEURO: The patient is awake, alert, and oriented.  The patient is cooperative.  

Normal speech.


MUSCULOSKELETAL: There is no tenderness or deformity.  There is no limitation 

range of motion.  





ED Course


                                   Vital Signs











  10/14/21 10/14/21





  04:29 04:47


 


Temperature 98.9 F 98.9 F


 


Pulse Rate 80 76


 


Respiratory 18 18





Rate  


 


Blood Pressure  107/61


 


Blood Pressure 107/61 107/61





[Right]  


 


O2 Sat by Pulse  98





Oximetry  














ED Medical Decision Making





- Lab Data


Result diagrams: 


                                 10/14/21 06:00





                                 10/14/21 06:00





                                   Lab Results











  10/14/21 10/14/21 10/14/21 Range/Units





  06:00 06:00 06:10 


 


WBC  9.2    (4.5-11.0)  K/mm3


 


RBC  4.84    (3.65-5.03)  M/mm3


 


Hgb  15.0    (11.8-15.2)  gm/dl


 


Hct  42.4    (35.5-45.6)  %


 


MCV  88    (84-94)  fl


 


MCH  31    (28-32)  pg


 


MCHC  35 H    (32-34)  %


 


RDW  14.2    (13.2-15.2)  %


 


Plt Count  148    (140-440)  K/mm3


 


Lymph % (Auto)  27.2    (13.4-35.0)  %


 


Mono % (Auto)  8.6 H    (0.0-7.3)  %


 


Eos % (Auto)  2.8    (0.0-4.3)  %


 


Baso % (Auto)  0.9    (0.0-1.8)  %


 


Lymph # (Auto)  2.5    (1.2-5.4)  K/mm3


 


Mono # (Auto)  0.8    (0.0-0.8)  K/mm3


 


Eos # (Auto)  0.3    (0.0-0.4)  K/mm3


 


Baso # (Auto)  0.1    (0.0-0.1)  K/mm3


 


Seg Neutrophils %  60.5    (40.0-70.0)  %


 


Seg Neutrophils #  5.6    (1.8-7.7)  K/mm3


 


Sodium   140   (137-145)  mmol/L


 


Potassium   3.6   (3.6-5.0)  mmol/L


 


Chloride   105.7   ()  mmol/L


 


Carbon Dioxide   24   (22-30)  mmol/L


 


Anion Gap   14   mmol/L


 


BUN   8 L   (9-20)  mg/dL


 


Creatinine   0.7 L   (0.8-1.3)  mg/dL


 


Estimated GFR   > 60   ml/min


 


BUN/Creatinine Ratio   11   %


 


Glucose   115 H   ()  mg/dL


 


Calcium   8.8   (8.4-10.2)  mg/dL


 


Total Bilirubin   0.60   (0.1-1.2)  mg/dL


 


AST   36   (5-40)  units/L


 


ALT   14   (7-56)  units/L


 


Alkaline Phosphatase   100   ()  units/L


 


Total Protein   6.8   (6.3-8.2)  g/dL


 


Albumin   4.0   (3.9-5)  g/dL


 


Albumin/Globulin Ratio   1.4   %


 


Urine Color     (Yellow)  


 


Urine Turbidity     (Clear)  


 


Urine pH     (5.0-7.0)  


 


Ur Specific Gravity     (1.003-1.030)  


 


Urine Protein     (Negative)  mg/dL


 


Urine Glucose (UA)     (Negative)  mg/dL


 


Urine Ketones     (Negative)  mg/dL


 


Urine Blood     (Negative)  


 


Urine Nitrite     (Negative)  


 


Urine Bilirubin     (Negative)  


 


Urine Urobilinogen     (<2.0)  mg/dL


 


Ur Leukocyte Esterase     (Negative)  


 


Urine WBC (Auto)     (0.0-6.0)  /HPF


 


Urine RBC (Auto)     (0.0-6.0)  /HPF


 


Urine Mucus     /HPF


 


Urine Opiates Screen     


 


Urine Methadone Screen     


 


Ur Barbiturates Screen     


 


Ur Phencyclidine Scrn     


 


Ur Amphetamines Screen     


 


U Benzodiazepines Scrn     


 


Urine Cocaine Screen     


 


U Marijuana (THC) Screen     


 


Drugs of Abuse Note     


 


Plasma/Serum Alcohol    < 0.01  (0-0.07)  %














  10/14/21 10/14/21 Range/Units





  Unknown Unknown 


 


WBC    (4.5-11.0)  K/mm3


 


RBC    (3.65-5.03)  M/mm3


 


Hgb    (11.8-15.2)  gm/dl


 


Hct    (35.5-45.6)  %


 


MCV    (84-94)  fl


 


MCH    (28-32)  pg


 


MCHC    (32-34)  %


 


RDW    (13.2-15.2)  %


 


Plt Count    (140-440)  K/mm3


 


Lymph % (Auto)    (13.4-35.0)  %


 


Mono % (Auto)    (0.0-7.3)  %


 


Eos % (Auto)    (0.0-4.3)  %


 


Baso % (Auto)    (0.0-1.8)  %


 


Lymph # (Auto)    (1.2-5.4)  K/mm3


 


Mono # (Auto)    (0.0-0.8)  K/mm3


 


Eos # (Auto)    (0.0-0.4)  K/mm3


 


Baso # (Auto)    (0.0-0.1)  K/mm3


 


Seg Neutrophils %    (40.0-70.0)  %


 


Seg Neutrophils #    (1.8-7.7)  K/mm3


 


Sodium    (137-145)  mmol/L


 


Potassium    (3.6-5.0)  mmol/L


 


Chloride    ()  mmol/L


 


Carbon Dioxide    (22-30)  mmol/L


 


Anion Gap    mmol/L


 


BUN    (9-20)  mg/dL


 


Creatinine    (0.8-1.3)  mg/dL


 


Estimated GFR    ml/min


 


BUN/Creatinine Ratio    %


 


Glucose    ()  mg/dL


 


Calcium    (8.4-10.2)  mg/dL


 


Total Bilirubin    (0.1-1.2)  mg/dL


 


AST    (5-40)  units/L


 


ALT    (7-56)  units/L


 


Alkaline Phosphatase    ()  units/L


 


Total Protein    (6.3-8.2)  g/dL


 


Albumin    (3.9-5)  g/dL


 


Albumin/Globulin Ratio    %


 


Urine Color  Yellow   (Yellow)  


 


Urine Turbidity  Clear   (Clear)  


 


Urine pH  6.0   (5.0-7.0)  


 


Ur Specific Gravity  1.013   (1.003-1.030)  


 


Urine Protein  <15 mg/dl   (Negative)  mg/dL


 


Urine Glucose (UA)  Neg   (Negative)  mg/dL


 


Urine Ketones  Neg   (Negative)  mg/dL


 


Urine Blood  Neg   (Negative)  


 


Urine Nitrite  Neg   (Negative)  


 


Urine Bilirubin  Neg   (Negative)  


 


Urine Urobilinogen  2.0   (<2.0)  mg/dL


 


Ur Leukocyte Esterase  Neg   (Negative)  


 


Urine WBC (Auto)  1.0   (0.0-6.0)  /HPF


 


Urine RBC (Auto)  1.0   (0.0-6.0)  /HPF


 


Urine Mucus  Few   /HPF


 


Urine Opiates Screen   Negative  


 


Urine Methadone Screen   Negative  


 


Ur Barbiturates Screen   Negative  


 


Ur Phencyclidine Scrn   Negative  


 


Ur Amphetamines Screen   Negative  


 


U Benzodiazepines Scrn   Negative  


 


Urine Cocaine Screen   Negative  


 


U Marijuana (THC) Screen   Negative  


 


Drugs of Abuse Note   Disclamer  


 


Plasma/Serum Alcohol    (0-0.07)  %














- Medical Decision Making





This patient presents to the emergency department with a complaint of "not 

feeling well", but could not be more specific as to his symptoms.  He also asked

 for a psychiatric evaluation.  He has a history of schizophrenia, but denies 

any auditory or visual hallucinations, or any suicidal or homicidal ideations.  

Patient is awake, oriented, calm and appropriate.  He does not appear to meet 

criteria to be made a 1013 or require inpatient stabilization.  He was later 

seen by the psychiatric team that agrees with this assessment and has provided 

outpatient referrals.





Labs have been unremarkable including CBC, metabolic panel, blood alcohol level,

 urinalysis and UDS.  Vital signs reassuring throughout his ED course.  The 

patient does not appear to have any medical condition or illness at this time 

that requires admission.  He has been given outpatient referral for [facility].  He will return to the emergency department with any worsening 

of his symptoms or with any acute distress.


Critical Care Time: No


Critical care attestation.: 


If time is entered above; I have spent that time in minutes in the direct care 

of this critically ill patient, excluding procedure time.








ED Disposition


Clinical Impression: 


 Medical clearance for psychiatric admission, Homeless





Schizophrenia


Qualifiers:


 Schizophrenia type: unspecified Qualified Code(s): F20.9 - Schizophrenia, 

unspecified





Disposition: 01 HOME / SELF CARE / HOMELESS


Is pt being admited?: No


Condition: Stable


Instructions:  Schizophrenia


Additional Instructions: 


Please follow-up with the [facility], or any of the 

outpatient referrals given to you by the psychiatric team.





                       OUTPATIENT MENTAL HEALTH RESOURCES





[facility]         Ian Muller MD:


[address]                [address]


[place] [address]               [place] [address]


 [phone]





[facility]:              [employer]               [address] [place] [address]            [place] [address]


[phone]





[facility]:      [facility]: 


[address]  [address]      [address]


[place] [address]               [facility] [id number] [phone]





[facility]:     Tristan Leyva MD:


[address]                 [address] [id number] [phone]





[facility]:   


[address] [place], [place] [id number] [phone]


                  **[place] CRISIS AND ACCESS LINE: [phone]**


Referrals: 


[facility] [Provider Group] - 2-3 Days


[facility] [Outside] - 2-3 Days

## 2021-10-23 NOTE — EMERGENCY DEPARTMENT REPORT
ED Psych HPI





- General


Chief Complaint: Psych


Stated Complaint: PSYCH


Time Seen by Provider: 10/23/21 01:30


Source: patient


Mode of arrival: Ambulatory


Limitations: No Limitations





- History of Present Illness


Initial Comments: 





Patient is a 41-year-old male presents emergency room for complaints of anxiety.

 Patient states he is tired he started this morning.  States it is unchanged.  

Patient denies suicidal and homicidal ideations.  Patient denies hallucinations.

 Patient denies depression.  Patient states he does not take any medications.  

Patient denies fever chills.








Patient denies recent travel.  Patient denies recent international travel.  

Patient denies exposure to the novel coronavirus.  Patient denies sick contacts.

 Patient denies fever and chills.  Patient denies cough.  Patient denies 

diarrhea.  Patient denies coming in contact with anybody with symptoms of the 

novel coronavirus.








MD Complaint: other (Anxiety)


-: Sudden, days(s)


Associated Psychiatric Symptoms: none


History of same: No


Quality: constant


Improves With: none


Worsens With: none


Associated Symptoms: denies: confusion, headache, shortness of breath, nausea, 

vomiting, syncope, insomnia





- Related Data


                                  Previous Rx's











 Medication  Instructions  Recorded  Last Taken  Type


 


FLUoxetine HCL [FLUoxetine] 20 mg PO DAILY #7 tablet 04/16/20 Unknown Rx


 


OLANzapine [Zyprexa] 10 mg PO BID #14 tablet 04/16/20 Unknown Rx


 


hydrOXYzine PAMOATE [Vistaril] 50 mg PO Q12H #14 capsule 04/16/20 Unknown Rx


 


traZODone [Desyrel] 100 mg PO QHS #7 tablet 04/16/20 Unknown Rx


 


Ondansetron [Zofran Odt] 4 mg PO Q8HR PRN #10 tab.rapdis 06/17/20 Unknown Rx


 


Ondansetron [Zofran Odt] 4 mg PO Q6H #20 tab.rapdis 05/13/21 Unknown Rx


 


cephALEXin [Keflex] 500 mg PO TID #28 cap 06/30/21 Unknown Rx


 


FLUoxetine HCL [Prozac] 20 mg PO DAILY 30 Days #30 capsule 07/01/21 Unknown Rx


 


traZODone [Desyrel] 50 mg PO QHS 30 Days #30 tab 07/01/21 Unknown Rx











                                    Allergies











Allergy/AdvReac Type Severity Reaction Status Date / Time


 


divalproex sodium Allergy  Unknown Verified 10/26/21 01:59





[From Depakote]     


 


quetiapine [From Seroquel] Allergy  Unknown Verified 10/26/21 01:59


 


risperidone [From Risperdal] Allergy  Unknown Verified 10/26/21 01:59














ED Review of Systems


ROS: 


Stated complaint: PSYCH


Other details as noted in HPI





Constitutional: denies: chills, fever


Eyes: denies: eye pain, eye discharge, vision change


ENT: denies: ear pain, throat pain


Respiratory: denies: cough, shortness of breath, wheezing


Cardiovascular: denies: chest pain, palpitations


Endocrine: no symptoms reported


Gastrointestinal: denies: abdominal pain, nausea, diarrhea


Genitourinary: denies: urgency, dysuria


Musculoskeletal: denies: back pain, joint swelling, arthralgia


Skin: denies: rash, lesions


Neurological: denies: headache, weakness, paresthesias


Psychiatric: anxiety.  denies: depression


Hematological/Lymphatic: denies: easy bleeding, easy bruising





ED Past Medical Hx





- Past Medical History


Previous Medical History?: Yes


Hx Psychiatric Treatment: Yes (bipolar, schizophrenia)





- Surgical History


Past Surgical History?: No





- Family History


Family history: no significant





- Social History


Smoking Status: Current Every Day Smoker


Substance Use Type: None





- Medications


Home Medications: 


                                Home Medications











 Medication  Instructions  Recorded  Confirmed  Last Taken  Type


 


FLUoxetine HCL [FLUoxetine] 20 mg PO DAILY #7 tablet 04/16/20  Unknown Rx


 


OLANzapine [Zyprexa] 10 mg PO BID #14 tablet 04/16/20  Unknown Rx


 


hydrOXYzine PAMOATE [Vistaril] 50 mg PO Q12H #14 capsule 04/16/20  Unknown Rx


 


traZODone [Desyrel] 100 mg PO QHS #7 tablet 04/16/20  Unknown Rx


 


Ondansetron [Zofran Odt] 4 mg PO Q8HR PRN #10 tab.rapdis 06/17/20  Unknown Rx


 


Ondansetron [Zofran Odt] 4 mg PO Q6H #20 tab.rapdis 05/13/21  Unknown Rx


 


cephALEXin [Keflex] 500 mg PO TID #28 cap 06/30/21  Unknown Rx


 


FLUoxetine HCL [Prozac] 20 mg PO DAILY 30 Days #30 capsule 07/01/21  Unknown Rx


 


traZODone [Desyrel] 50 mg PO QHS 30 Days #30 tab 07/01/21  Unknown Rx














ED Physical Exam





- General


General appearance: alert, in no apparent distress





- Head


Head exam: Present: atraumatic, normocephalic





- Eye


Eye exam: Present: normal appearance





- ENT


ENT exam: Present: mucous membranes moist





- Neck


Neck exam: Present: normal inspection





- Respiratory


Respiratory exam: Present: normal lung sounds bilaterally.  Absent: respiratory 

distress





- Cardiovascular


Cardiovascular Exam: Present: regular rate, normal rhythm, normal heart sounds. 

 Absent: systolic murmur, diastolic murmur, rubs, gallop





- GI/Abdominal


GI/Abdominal exam: Present: soft, normal bowel sounds





- Rectal


Rectal exam: Present: deferred





- Extremities Exam


Extremities exam: Present: normal inspection





- Back Exam


Back exam: Present: normal inspection





- Neurological Exam


Neurological exam: Present: alert, oriented X3





- Psychiatric


Psychiatric exam: Present: normal affect, normal mood





- Skin


Skin exam: Present: warm, dry, intact, normal color.  Absent: rash





ED Course





- Reevaluation(s)


Reevaluation #1: 


I discussed all results and clinical findings with patient.  I discussed plan of

 care with patient.  Patient agrees with plan of care.  Patient is stable for 

discharge.  Patient will be discharged home.  Patient given discharge 

instructions.  Patient voiced understanding of discharge instructions.


10/23/21 01:40














ED Medical Decision Making





- Medical Decision Making





Patient is a 41-year-old male who presents emergency room with complaints of 

anxiety.  Patient is stable.  Patient does not require a 1013.  Patient does not

 require inpatient service.  Patient not require further emergency medical 

service.  Patient stable for discharge.  Patient discharged home.  Patient left 

without excepting his discharge paperwork.





I discussed all results and clinical findings with patient.  I discussed plan of

 care with patient.  Patient agrees with plan of care.  Patient is stable for 

discharge.  Patient will be discharged home.  Patient given discharge 

instructions.  Patient voiced understanding of discharge instructions.





- Differential Diagnosis


Anxiety


Critical care attestation.: 


If time is entered above; I have spent that time in minutes in the direct care 

of this critically ill patient, excluding procedure time.








ED Disposition


Clinical Impression: 


 Anxiety





Disposition: 01 HOME / SELF CARE / HOMELESS


Is pt being admited?: No


Does the pt Need Aspirin: No


Condition: Stable


Instructions:  Managing Anxiety, Adult


Additional Instructions: 


Patient to follow-up with primary care in 2 to 3 days.    Patient to rest.  

Patient to increase water.  Patient to try relaxation techniques. Patient to 

return to the ER if condition worsens, changes or new symptoms arise.


Referrals: 


[facility] [Primary Care Provider] - 2-3 Days


Time of Disposition: 01:47

## 2021-10-26 NOTE — EMERGENCY DEPARTMENT REPORT
ED General Adult HPI





- General


Chief complaint: Psych


Stated complaint: MH


Time Seen by Provider: 10/26/21 02:32


Source: patient, EMS, RN notes reviewed, old records reviewed


Mode of arrival: Ambulatory


Limitations: No Limitations





- History of Present Illness


Initial comments: 





The patient was evaluated in the emergency department for symptoms described in 

the history of present illness.  He/she was evaluated in the context of the 

global COVID-19 pandemic, which necessitated consideration that the patient 

might be at risk for infection with the virus that causes COVID-19.  

Institutional protocols and algorithms that pertain to the evaluation of 

patients at risk for COVID-19 are in a state of rapid change based on 

information released by regulatory bodies including the CDC and federal and 

state organizations.  These policies and algorithms were followed during the 

patient's care in the emergency department.  Please note that these policies, pr

ocedures and recommendations changed on a rapid basis.





This patient is a 41-year-old gentleman.  He presents to the ER today with a 

complaint of "I am fine."  The patient was recently seen in this department, and

cleared by our psychiatric team on October 14.  He presented to this department 

3 days ago, and was again cleared by one of my colleagues.  He presents to the 

ER today without headache, neck pain, chest pain, abdominal pain or shortness of

breath.  He denies homicidality and suicidality.  He denies overdose.  He 

indicates that he would like to be left alone.  He denies additional complaints 

at this time.  He does endorse chronic hallucinations.


Improves with: none


Worsens with: none





- Related Data


                                  Previous Rx's











 Medication  Instructions  Recorded  Last Taken  Type


 


FLUoxetine HCL [FLUoxetine] 20 mg PO DAILY #7 tablet 04/16/20 Unknown Rx


 


OLANzapine [Zyprexa] 10 mg PO BID #14 tablet 04/16/20 Unknown Rx


 


hydrOXYzine PAMOATE [Vistaril] 50 mg PO Q12H #14 capsule 04/16/20 Unknown Rx


 


traZODone [Desyrel] 100 mg PO QHS #7 tablet 04/16/20 Unknown Rx


 


Ondansetron [Zofran Odt] 4 mg PO Q8HR PRN #10 tab.rapdis 06/17/20 Unknown Rx


 


Ondansetron [Zofran Odt] 4 mg PO Q6H #20 tab.rapdis 05/13/21 Unknown Rx


 


cephALEXin [Keflex] 500 mg PO TID #28 cap 06/30/21 Unknown Rx


 


FLUoxetine HCL [Prozac] 20 mg PO DAILY 30 Days #30 capsule 07/01/21 Unknown Rx


 


traZODone [Desyrel] 50 mg PO QHS 30 Days #30 tab 07/01/21 Unknown Rx











                                    Allergies











Allergy/AdvReac Type Severity Reaction Status Date / Time


 


divalproex sodium Allergy  Unknown Verified 10/26/21 01:59





[From Depakote]     


 


quetiapine [From Seroquel] Allergy  Unknown Verified 10/26/21 01:59


 


risperidone [From Risperdal] Allergy  Unknown Verified 10/26/21 01:59














ED Review of Systems


ROS: 


Stated complaint: MH


Other details as noted in HPI





Constitutional: denies: fever


Eyes: denies: eye discharge


ENT: denies: epistaxis


Respiratory: denies: cough


Cardiovascular: denies: chest pain


Gastrointestinal: denies: abdominal pain


Genitourinary: denies: dysuria


Neurological: denies: weakness


Psychiatric: denies: homicidal thoughts, suicidal thoughts





ED Past Medical Hx





- Past Medical History


Previous Medical History?: Yes


Hx Psychiatric Treatment: Yes (bipolar, schizophrenia)





- Surgical History


Past Surgical History?: No





- Social History


Smoking Status: Never Smoker


Substance Use Type: None





- Medications


Home Medications: 


                                Home Medications











 Medication  Instructions  Recorded  Confirmed  Last Taken  Type


 


FLUoxetine HCL [FLUoxetine] 20 mg PO DAILY #7 tablet 04/16/20  Unknown Rx


 


OLANzapine [Zyprexa] 10 mg PO BID #14 tablet 04/16/20  Unknown Rx


 


hydrOXYzine PAMOATE [Vistaril] 50 mg PO Q12H #14 capsule 04/16/20  Unknown Rx


 


traZODone [Desyrel] 100 mg PO QHS #7 tablet 04/16/20  Unknown Rx


 


Ondansetron [Zofran Odt] 4 mg PO Q8HR PRN #10 tab.rapdis 06/17/20  Unknown Rx


 


Ondansetron [Zofran Odt] 4 mg PO Q6H #20 tab.rapdis 05/13/21  Unknown Rx


 


cephALEXin [Keflex] 500 mg PO TID #28 cap 06/30/21  Unknown Rx


 


FLUoxetine HCL [Prozac] 20 mg PO DAILY 30 Days #30 capsule 07/01/21  Unknown Rx


 


traZODone [Desyrel] 50 mg PO QHS 30 Days #30 tab 07/01/21  Unknown Rx














ED Physical Exam





- General


Limitations: No Limitations


General appearance: alert, in no apparent distress





- Head


Head exam: Present: atraumatic, normocephalic





- Eye


Eye exam: Present: normal appearance, EOMI.  Absent: nystagmus





- ENT


ENT exam: Present: normal exam, normal orophraynx, mucous membranes moist, 

normal external ear exam





- Neck


Neck exam: Present: normal inspection, full ROM.  Absent: tenderness, 

meningismus





- Respiratory


Respiratory exam: Present: normal lung sounds bilaterally.  Absent: respiratory 

distress, wheezes, rales, rhonchi, stridor, decreased breath sounds





- Cardiovascular


Cardiovascular Exam: Present: regular rate, normal rhythm, normal heart sounds. 

 Absent: bradycardia, tachycardia, irregular rhythm, systolic murmur, diastolic 

murmur, rubs, gallop





- GI/Abdominal


GI/Abdominal exam: Present: soft.  Absent: distended, tenderness, guarding, 

rebound, rigid, pulsatile mass





- Rectal


Rectal exam: Present: deferred





- Extremities Exam


Extremities exam: Present: normal inspection, full ROM, other (2+ pulses noted 

in the bilateral upper and lower extremities.  There is no palpable cord.   

negative Homans sign.  Muscular compartments are soft.  The pelvis is stable.). 

 Absent: pedal edema, calf tenderness





- Back Exam


Back exam: Present: normal inspection, full ROM.  Absent: tenderness, CVA 

tenderness (R), CVA tenderness (L), paraspinal tenderness, vertebral tenderness





- Neurological Exam


Neurological exam: Present: alert, oriented X3, normal gait, other (No facial 

droop.  Tongue midline.  Extraocular movements intact bilaterally.  Facial 

sensation intact to light touch in V1, V2, V3 distribution bilaterally.  5 and a

 5 strength in 4 extremities.  Sensation intact to light touch in 4 

extremities.).  Absent: motor sensory deficit





- Psychiatric


Psychiatric exam: Present: flat affect.  Absent: homicidal ideation, suicidal 

ideation





- Skin


Skin exam: Present: warm, dry, intact, normal color.  Absent: rash





ED Course


                                   Vital Signs











  10/26/21





  01:55


 


Temperature 98.5 F


 


Pulse Rate 84


 


Respiratory 16





Rate 


 


Blood Pressure 109/67


 


O2 Sat by Pulse 94





Oximetry 














ED Medical Decision Making





- Lab Data








                                   Vital Signs











  10/26/21





  01:55


 


Temperature 98.5 F


 


Pulse Rate 84


 


Respiratory 16





Rate 


 


Blood Pressure 109/67


 


O2 Sat by Pulse 94





Oximetry 














- Radiology Data





Differential diagnosis, including but not limited to: Encounter for behavioral 

health screening examination, encounter for medical screening examination





Assessment and plan: 41-year-old gentleman, who is awake, alert, oriented and 

sober.  He does not meet criteria for 1013 hold or involuntary hold.  He denies 

acute medical complaints to myself.  He was recently seen in this department by 

one of my colleagues in the psychiatry team, had nonactionable laboratory 

studies, and was cleared by the psychiatry team.





He was also seen by my colleague on October 23, and again discharge.





The patient appears unkempt, and disheveled, and is likely homeless/undomiciled.

  He is resting comfortably on the chair, and in no acute distress.  I suspect 

that this patient is presenting for the purposes of secondary gain, i.e. food 

and shelter.





He does not appear to have an emergent medical condition present.  He does not 

appear to be decompensated psychiatrically to the point where he would require 

1013 or involuntary hold.  He will be discharged with instructions to follow-up 

as an outpatient.


Critical care attestation.: 


If time is entered above; I have spent that time in minutes in the direct care 

of this critically ill patient, excluding procedure time.








ED Disposition


Clinical Impression: 


 Encounter for medical screening examination, Encounter for behavioral health 

screening





Disposition: 01 HOME / SELF CARE / HOMELESS


Is pt being admited?: No


Does the pt Need Aspirin: No


Condition: Good


Instructions:  Health Maintenance, Male


Additional Instructions: 


Please continue current outpatient medications.  Please follow-up with 

outpatient resources that were recently provided to the patient.  Please 

continue your current outpatient medications.  Please follow-up with a primary 

care doctor or health department within the next 2 weeks.





Please return to the emergency room right away with new pain, worsened pain, 

migration of pain, projectile vomiting, change in mental status, confusion, 

inability to tolerate liquid feeds, new, worsened or different symptoms not 

present on the initial emergency room evaluation.


Referrals: 


[facility] [Outside] - 3-5 Days


[facility] [Outside] - 3-5 Days

## 2021-10-27 NOTE — EMERGENCY DEPARTMENT REPORT
ED General Adult HPI





- General


Chief complaint: Psych


Stated complaint: HEARING VOICES


Time Seen by Provider: 10/27/21 21:05


Source: patient


Mode of arrival: Ambulatory


Limitations: No Limitations





- History of Present Illness


Initial comments: 





The patient presents to emergency department the chief complaint of suicidal and

homicidal ideations.  Patient states he is hearing voices telling him to hurt 

others.  He also states the voices are telling him to hurt himself.


-: unknown


Severity scale (0 -10): 0


Consistency: constant


Improves with: none


Worsens with: none


Associated Symptoms: denies other symptoms


Treatments Prior to Arrival: none





- Related Data


                                  Previous Rx's











 Medication  Instructions  Recorded  Last Taken  Type


 


FLUoxetine HCL [FLUoxetine] 20 mg PO DAILY #7 tablet 04/16/20 Unknown Rx


 


OLANzapine [Zyprexa] 10 mg PO BID #14 tablet 04/16/20 Unknown Rx


 


hydrOXYzine PAMOATE [Vistaril] 50 mg PO Q12H #14 capsule 04/16/20 Unknown Rx


 


traZODone [Desyrel] 100 mg PO QHS #7 tablet 04/16/20 Unknown Rx


 


Ondansetron [Zofran Odt] 4 mg PO Q8HR PRN #10 tab.rapdis 06/17/20 Unknown Rx


 


Ondansetron [Zofran Odt] 4 mg PO Q6H #20 tab.rapdis 05/13/21 Unknown Rx


 


cephALEXin [Keflex] 500 mg PO TID #28 cap 06/30/21 Unknown Rx


 


FLUoxetine HCL [Prozac] 20 mg PO DAILY 30 Days #30 capsule 07/01/21 Unknown Rx


 


traZODone [Desyrel] 50 mg PO QHS 30 Days #30 tab 07/01/21 Unknown Rx











                                    Allergies











Allergy/AdvReac Type Severity Reaction Status Date / Time


 


divalproex sodium Allergy  Unknown Verified 10/26/21 01:59





[From Depakote]     


 


quetiapine [From Seroquel] Allergy  Unknown Verified 10/26/21 01:59


 


risperidone [From Risperdal] Allergy  Unknown Verified 10/26/21 01:59














ED Review of Systems


ROS: 


Stated complaint: HEARING VOICES


Other details as noted in HPI





Constitutional: denies: chills, fever


Eyes: denies: eye pain, eye discharge, vision change


ENT: denies: ear pain, throat pain


Respiratory: denies: cough, shortness of breath, wheezing


Cardiovascular: denies: chest pain, palpitations


Endocrine: no symptoms reported


Gastrointestinal: denies: abdominal pain, nausea, diarrhea


Genitourinary: denies: urgency, dysuria


Musculoskeletal: denies: back pain, joint swelling, arthralgia


Skin: denies: rash, lesions


Neurological: denies: headache, weakness, paresthesias


Psychiatric: auditory hallucinations, homicidal thoughts, suicidal thoughts.  

denies: anxiety, depression


Hematological/Lymphatic: denies: easy bleeding, easy bruising





ED Past Medical Hx





- Past Medical History


Previous Medical History?: Yes


Hx Hypertension: Yes


Hx Psychiatric Treatment: Yes (bipolar, schizophrenia)





- Surgical History


Past Surgical History?: No





- Social History


Smoking Status: Never Smoker


Substance Use Type: None





- Medications


Home Medications: 


                                Home Medications











 Medication  Instructions  Recorded  Confirmed  Last Taken  Type


 


FLUoxetine HCL [FLUoxetine] 20 mg PO DAILY #7 tablet 04/16/20  Unknown Rx


 


OLANzapine [Zyprexa] 10 mg PO BID #14 tablet 04/16/20  Unknown Rx


 


hydrOXYzine PAMOATE [Vistaril] 50 mg PO Q12H #14 capsule 04/16/20  Unknown Rx


 


traZODone [Desyrel] 100 mg PO QHS #7 tablet 04/16/20  Unknown Rx


 


Ondansetron [Zofran Odt] 4 mg PO Q8HR PRN #10 tab.rapdis 06/17/20  Unknown Rx


 


Ondansetron [Zofran Odt] 4 mg PO Q6H #20 tab.rapdis 05/13/21  Unknown Rx


 


cephALEXin [Keflex] 500 mg PO TID #28 cap 06/30/21  Unknown Rx


 


FLUoxetine HCL [Prozac] 20 mg PO DAILY 30 Days #30 capsule 07/01/21  Unknown Rx


 


traZODone [Desyrel] 50 mg PO QHS 30 Days #30 tab 07/01/21  Unknown Rx














ED Physical Exam





- General


Limitations: No Limitations


General appearance: alert, in no apparent distress





- Head


Head exam: Present: atraumatic, normocephalic





- Eye


Eye exam: Present: normal appearance, PERRL, EOMI





- ENT


ENT exam: Present: mucous membranes moist





- Neck


Neck exam: Present: normal inspection





- Respiratory


Respiratory exam: Present: normal lung sounds bilaterally.  Absent: respiratory 

distress





- Cardiovascular


Cardiovascular Exam: Present: regular rate, normal rhythm.  Absent: systolic 

murmur, diastolic murmur, rubs, gallop





- GI/Abdominal


GI/Abdominal exam: Present: soft, normal bowel sounds.  Absent: distended, 

tenderness





- Rectal


Rectal exam: Present: deferred





- Extremities Exam


Extremities exam: Present: normal inspection





- Back Exam


Back exam: Present: normal inspection





- Neurological Exam


Neurological exam: Present: alert, oriented X3, CN II-XII intact.  Absent: motor

 sensory deficit





- Psychiatric


Psychiatric exam: Present: flat affect, homicidal ideation, suicidal ideation





- Skin


Skin exam: Present: warm, dry, intact, normal color.  Absent: rash





ED Course


                                   Vital Signs











  10/27/21 10/27/21 10/27/21





  20:19 20:22 23:19


 


Temperature  97.9 F 


 


Pulse Rate 78  


 


Respiratory 18  





Rate   


 


Blood Pressure 112/51  


 


O2 Sat by Pulse 96  95





Oximetry   














ED Medical Decision Making





- Lab Data


Result diagrams: 


                                 10/27/21 21:16





                                 10/27/21 21:16








                                   Lab Results











  10/27/21 10/27/21 10/27/21 Range/Units





  21:16 21:16 21:16 


 


WBC  5.9    (4.5-11.0)  K/mm3


 


RBC  4.79    (3.65-5.03)  M/mm3


 


Hgb  14.6    (11.8-15.2)  gm/dl


 


Hct  43.8    (35.5-45.6)  %


 


MCV  91    (84-94)  fl


 


MCH  31    (28-32)  pg


 


MCHC  33    (32-34)  %


 


RDW  15.6 H    (13.2-15.2)  %


 


Plt Count  227    (140-440)  K/mm3


 


Lymph % (Auto)  38.0 H    (13.4-35.0)  %


 


Mono % (Auto)  9.4 H    (0.0-7.3)  %


 


Eos % (Auto)  2.6    (0.0-4.3)  %


 


Baso % (Auto)  1.2    (0.0-1.8)  %


 


Lymph # (Auto)  2.2    (1.2-5.4)  K/mm3


 


Mono # (Auto)  0.6    (0.0-0.8)  K/mm3


 


Eos # (Auto)  0.2    (0.0-0.4)  K/mm3


 


Baso # (Auto)  0.1    (0.0-0.1)  K/mm3


 


Seg Neutrophils %  48.8    (40.0-70.0)  %


 


Seg Neutrophils #  2.9    (1.8-7.7)  K/mm3


 


Sodium     (137-145)  mmol/L


 


Potassium     (3.6-5.0)  mmol/L


 


Chloride     ()  mmol/L


 


Carbon Dioxide     (22-30)  mmol/L


 


Anion Gap     mmol/L


 


BUN     (9-20)  mg/dL


 


Creatinine     (0.8-1.3)  mg/dL


 


Estimated GFR     ml/min


 


BUN/Creatinine Ratio     %


 


Glucose     ()  mg/dL


 


Calcium     (8.4-10.2)  mg/dL


 


Total Bilirubin     (0.1-1.2)  mg/dL


 


AST     (5-40)  units/L


 


ALT     (7-56)  units/L


 


Alkaline Phosphatase     ()  units/L


 


Total Protein     (6.3-8.2)  g/dL


 


Albumin     (3.9-5)  g/dL


 


Albumin/Globulin Ratio     %


 


Salicylates   < 0.3 L   (2.8-20.0)  mg/dL


 


Acetaminophen    5.0 L  (10.0-30.0)  ug/mL


 


Plasma/Serum Alcohol     (0-0.07)  %














  10/27/21 10/27/21 Range/Units





  21:16 21:16 


 


WBC    (4.5-11.0)  K/mm3


 


RBC    (3.65-5.03)  M/mm3


 


Hgb    (11.8-15.2)  gm/dl


 


Hct    (35.5-45.6)  %


 


MCV    (84-94)  fl


 


MCH    (28-32)  pg


 


MCHC    (32-34)  %


 


RDW    (13.2-15.2)  %


 


Plt Count    (140-440)  K/mm3


 


Lymph % (Auto)    (13.4-35.0)  %


 


Mono % (Auto)    (0.0-7.3)  %


 


Eos % (Auto)    (0.0-4.3)  %


 


Baso % (Auto)    (0.0-1.8)  %


 


Lymph # (Auto)    (1.2-5.4)  K/mm3


 


Mono # (Auto)    (0.0-0.8)  K/mm3


 


Eos # (Auto)    (0.0-0.4)  K/mm3


 


Baso # (Auto)    (0.0-0.1)  K/mm3


 


Seg Neutrophils %    (40.0-70.0)  %


 


Seg Neutrophils #    (1.8-7.7)  K/mm3


 


Sodium  138   (137-145)  mmol/L


 


Potassium  3.6   (3.6-5.0)  mmol/L


 


Chloride  101.6   ()  mmol/L


 


Carbon Dioxide  24   (22-30)  mmol/L


 


Anion Gap  16   mmol/L


 


BUN  6 L   (9-20)  mg/dL


 


Creatinine  0.7 L   (0.8-1.3)  mg/dL


 


Estimated GFR  > 60   ml/min


 


BUN/Creatinine Ratio  9   %


 


Glucose  80   ()  mg/dL


 


Calcium  8.8   (8.4-10.2)  mg/dL


 


Total Bilirubin  0.30   (0.1-1.2)  mg/dL


 


AST  18   (5-40)  units/L


 


ALT  10   (7-56)  units/L


 


Alkaline Phosphatase  96   ()  units/L


 


Total Protein  6.4   (6.3-8.2)  g/dL


 


Albumin  3.8 L   (3.9-5)  g/dL


 


Albumin/Globulin Ratio  1.5   %


 


Salicylates    (2.8-20.0)  mg/dL


 


Acetaminophen    (10.0-30.0)  ug/mL


 


Plasma/Serum Alcohol   < 0.01  (0-0.07)  %














- Medical Decision Making





1013 applied


Awaiting medical clearance


Awaiting mental health evaluation


Critical care attestation.: 


If time is entered above; I have spent that time in minutes in the direct care 

of this critically ill patient, excluding procedure time.








ED Disposition


Clinical Impression: 


 Auditory hallucinations, Homicidal ideations, Suicidal ideations





Disposition: [address] [place] [address]


Is pt being admited?: No


Does the pt Need Aspirin: No


Condition: Stable


Referrals: 


PRIMARY CARE,MD [Primary Care Provider] - 3-5 Days

## 2021-10-27 NOTE — EVENT NOTE
ED Screening Note


ED Screening Note: 


Presents to ED c/o of Suicidal ideation and auditory hallucinations. 





This initial assessment/diagnostic orders/clinical plan/treatment(s) is/are 

subject to change based on patients health status, clinical progression and re-

assessment by fellow clinical providers in the ED. Further treatment and workup 

at subsequent clinical providers discretion. Patient/guardian urged not to elope

from the ED as their condition may be serious if not clinically assessed and 

managed. 





Initial orders include: 


PSYCH protocol consider 1013

## 2021-10-28 NOTE — EVENT NOTE
Date: 10/28/21


Patient evaluated by our mental health colleagues and it was determined that the

patient did not meet inpatient criteria.  Patient voicing chronic auditory 

hallucinations and is not homicidal suicidal at this time.  Patient seems to be 

here for possible secondary gain.  Patient prescribed medications for 

stabilization and has 1013 has been rescinded.  Please see psychiatry note as 

follows:








Psychiatric Consult Note





Patient Name: IFTIKHAR STAPLETON   Medical Record Number: [id number]


Date of Birth: [birth year]   Patient Status: Emergency


Emergency Provider: KENYON JENSEN   Account Number: [id number]


Date: 10/28/21 09:02   Initialization Date: 10/28/21 09:02








History of Present Illness





- Reason for Consult


Consult date: 10/28/21


Reason for consult: MHE





- History of Present Psychiatric Illness


The patient was seen today. He initially tells me that he didn't feel like 

talking and to come back. Hiss responses are vague. He says he came to the [facility] because he didn't feel good. When asking the patient why was he not 

feeling well, he says "I don't know." He then says "I have schizophrenia." I ask

the patient was he hallucinating, he says "I was seeing things." I ask him what 

was he seeing, he says "I don't know." I ask the patient how long has he been 

seeing things, he says "two months, maybe two days." He denies any illicit drug 

use or alcohol. When asking the patient was he suicidal or homicidal, he replied

"no, I just don't feel good."  





PAST PSYCHIATRIC HISTORY:


Diagnoses: schizophrenia


Suicide attempts or Self-harm behavior: Denies


Prior psychiatric hospitalizations: Yes


Substance Abuse history: Denies


Previous psychiatric medications tried: Denies


Outpatient treatment: Denies





PAST MEDICAL HISTORY: None reported or document





Family Psychiatric History: None reported or documented





SOCIAL HISTORY


Marital Status: Single 


Living Arrangements: "lives at a house"


Employment Status: Unemployed


Access to guns/weapons: Denies


Education: 


History of Abuse: Denies


Legal History: unknown





REVIEW OF SYSTEMS  


Constitutional: Negative for weight loss


ENT: Negative for stridor


Respiratory: Negative for cough or hemoptysis


All other systems reviewed and are negative





MENTAL STATUS EXAMINATION


General Appearance and Behavior: Age appropriate, good hygiene, wearing 

appropriate clothes. calm, cooperative


Cooperation: Cooperative


Psychomotor Behavior: Psychomotor normal


Mood: anxious


Affect and affective range: Congruent with stated mood


Thought Process: circumstantial


Thought Content: None


Speech: normal tone and pace


Suicidal Ideation: Denies


Homicidal Ideation: Denies


Hallucinations: seeing things when he came in


Delusions: None elicited


Impulse Control: Normal


Insight and Judgment: Limited


Memory: Limited


Attention: attentive


Orientation: a/o x 3





Assessment


(1) Mental Health Evaluation


Current Visit: Yes   Status: Acute





Treatment Plan


d/c 1013


Script: Olanzapine 5mg po daily


The patient to comply with previously prescribed medications


Risks, benefits and alternatives of medications discussed with the patient, 

questions answered and consent obtained from patient.


PSYCHOTHERAPY: Supportive psychotherapy provided


MEDICAL: Per primary team


DELIRIUM PRECAUTIONS: Please re-orient patient frequently, keep lights on during

the day, and minimize benzodiazepines and opiates as these medications could 

worsen patient's confusion.


SAFETY SITTER: Defer to primary


DISPOSITION: Do not recommend acute psychiatric inpatient treatment. The patient

understands that if SI/HI or fear of endangerment are to arise he is to seek 

immediate assistance. 


The  to further discuss safety plan


The  to give the patient all necessary outpatient resources 


FOLLOW-UP: sign off. Thanks


Case staffed with Dr. Zaragoza

## 2021-10-28 NOTE — CONSULTATION
History of Present Illness





- Reason for Consult


Consult date: 10/28/21


Reason for consult: MHE





- History of Present Psychiatric Illness


The patient was seen today. He initially tells me that he didn't feel like 

talking and to come back. Hiss responses are vague. He says he came to the 

hospital because he didn't feel good. When asking the patient why was he not 

feeling well, he says "I don't know." He then says "I have schizophrenia." I ask

the patient was he hallucinating, he says "I was seeing things." I ask him what 

was he seeing, he says "I don't know." I ask the patient how long has he been 

seeing things, he says "two months, maybe two days." He denies any illicit drug 

use or alcohol. When asking the patient was he suicidal or homicidal, he replied

"no, I just don't feel good."  





PAST PSYCHIATRIC HISTORY:


Diagnoses: schizophrenia


Suicide attempts or Self-harm behavior: Denies


Prior psychiatric hospitalizations: Yes


Substance Abuse history: Denies


Previous psychiatric medications tried: Denies


Outpatient treatment: Denies





PAST MEDICAL HISTORY: None reported or document





Family Psychiatric History: None reported or documented





SOCIAL HISTORY


Marital Status: Single 


Living Arrangements: "lives at a house"


Employment Status: Unemployed


Access to guns/weapons: Denies


Education: 


History of Abuse: Denies


Legal History: unknown





REVIEW OF SYSTEMS  


Constitutional: Negative for weight loss


ENT: Negative for stridor


Respiratory: Negative for cough or hemoptysis


All other systems reviewed and are negative





MENTAL STATUS EXAMINATION


General Appearance and Behavior: Age appropriate, good hygiene, wearing 

appropriate clothes. calm, cooperative


Cooperation: Cooperative


Psychomotor Behavior: Psychomotor normal


Mood: anxious


Affect and affective range: Congruent with stated mood


Thought Process: circumstantial


Thought Content: None


Speech: normal tone and pace


Suicidal Ideation: Denies


Homicidal Ideation: Denies


Hallucinations: seeing things when he came in


Delusions: None elicited


Impulse Control: Normal


Insight and Judgment: Limited


Memory: Limited


Attention: attentive


Orientation: a/o x 3





 Assessment 


(1) Mental Health Evaluation


Current Visit: Yes   Status: Acute





Treatment Plan


d/c 1013


Script: Olanzapine 5mg po daily


The patient to comply with previously prescribed medications


Risks, benefits and alternatives of medications discussed with the patient, qu

estions answered and consent obtained from patient.


PSYCHOTHERAPY: Supportive psychotherapy provided


MEDICAL: Per primary team


DELIRIUM PRECAUTIONS: Please re-orient patient frequently, keep lights on during

the day, and minimize benzodiazepines and opiates as these medications could 

worsen patient's confusion.


SAFETY SITTER: Defer to primary


DISPOSITION: Do not recommend acute psychiatric inpatient treatment. The patient

understands that if SI/HI or fear of endangerment are to arise he is to seek 

immediate assistance. 


The  to further discuss safety plan


The  to give the patient all necessary outpatient resources 


FOLLOW-UP: sign off. Thanks


Case staffed with Dr. Zaragoza











Medications and Allergies


                                    Allergies











Allergy/AdvReac Type Severity Reaction Status Date / Time


 


divalproex sodium Allergy  Unknown Verified 10/26/21 01:59





[From Depakote]     


 


quetiapine [From Seroquel] Allergy  Unknown Verified 10/26/21 01:59


 


risperidone [From Risperdal] Allergy  Unknown Verified 10/26/21 01:59











                                Home Medications











 Medication  Instructions  Recorded  Confirmed  Last Taken  Type


 


FLUoxetine HCL [FLUoxetine] 20 mg PO DAILY #7 tablet 04/16/20  Unknown Rx


 


OLANzapine [Zyprexa] 10 mg PO BID #14 tablet 04/16/20  Unknown Rx


 


hydrOXYzine PAMOATE [Vistaril] 50 mg PO Q12H #14 capsule 04/16/20  Unknown Rx


 


traZODone [Desyrel] 100 mg PO QHS #7 tablet 04/16/20  Unknown Rx


 


Ondansetron [Zofran Odt] 4 mg PO Q8HR PRN #10 tab.rapdis 06/17/20  Unknown Rx


 


Ondansetron [Zofran Odt] 4 mg PO Q6H #20 tab.rapdis 05/13/21  Unknown Rx


 


cephALEXin [Keflex] 500 mg PO TID #28 cap 06/30/21  Unknown Rx


 


FLUoxetine HCL [Prozac] 20 mg PO DAILY 30 Days #30 capsule 07/01/21  Unknown Rx


 


traZODone [Desyrel] 50 mg PO QHS 30 Days #30 tab 07/01/21  Unknown Rx


 


OLANZapine [Olanzapine] 5 mg PO DAILY #30 tablet 10/28/21  Unknown Rx














Mental Status Exam





- Vital signs


                                Last Vital Signs











Temp  98.6 F   10/28/21 07:50


 


Pulse  83   10/28/21 07:50


 


Resp  20   10/28/21 07:50


 


BP  110/60   10/28/21 07:50


 


Pulse Ox  100   10/28/21 07:50














Results


Result Diagrams: 


                                 10/27/21 21:16





                                 10/27/21 21:16


                              Abnormal lab results











  10/27/21 10/27/21 10/27/21 Range/Units





  21:16 21:16 21:16 


 


RDW  15.6 H    (13.2-15.2)  %


 


Lymph % (Auto)  38.0 H    (13.4-35.0)  %


 


Mono % (Auto)  9.4 H    (0.0-7.3)  %


 


BUN     (9-20)  mg/dL


 


Creatinine     (0.8-1.3)  mg/dL


 


Albumin     (3.9-5)  g/dL


 


Salicylates   < 0.3 L   (2.8-20.0)  mg/dL


 


Acetaminophen    5.0 L  (10.0-30.0)  ug/mL














  10/27/21 Range/Units





  21:16 


 


RDW   (13.2-15.2)  %


 


Lymph % (Auto)   (13.4-35.0)  %


 


Mono % (Auto)   (0.0-7.3)  %


 


BUN  6 L  (9-20)  mg/dL


 


Creatinine  0.7 L  (0.8-1.3)  mg/dL


 


Albumin  3.8 L  (3.9-5)  g/dL


 


Salicylates   (2.8-20.0)  mg/dL


 


Acetaminophen   (10.0-30.0)  ug/mL








All other labs normal.

## 2021-10-30 NOTE — EMERGENCY DEPARTMENT REPORT
ED Psych HPI





- General


Chief Complaint: Anxiety


Stated Complaint: RX REFILL


Time Seen by Provider: 10/30/21 21:13


Source: patient


Mode of arrival: Ambulatory


Limitations: No Limitations





- History of Present Illness


Initial Comments: 





CC: I just want to get checked out by the doctor.





HPI:  This is a 40 yo male with hx of schizophrenia who "just wants to get 

checked out".  He wants medication for anxiety.  No endorsement of SI HI 

hallucinations.





Patient was evaluated at this hospital by MH team.  Given script for Olanzapine.

 








MD Complaint: other (I want something for my anxiety)


-: Gradual


History of same: Yes


Quality: constant


Improves With: none


Worsens With: none


Context: not taking psychiatric


Associated Symptoms: denies other symptoms





- Related Data


                                  Previous Rx's











 Medication  Instructions  Recorded  Last Taken  Type


 


FLUoxetine HCL [FLUoxetine] 20 mg PO DAILY #7 tablet 04/16/20 Unknown Rx


 


OLANzapine [Zyprexa] 10 mg PO BID #14 tablet 04/16/20 Unknown Rx


 


hydrOXYzine PAMOATE [Vistaril] 50 mg PO Q12H #14 capsule 04/16/20 Unknown Rx


 


traZODone [Desyrel] 100 mg PO QHS #7 tablet 04/16/20 Unknown Rx


 


Ondansetron [Zofran Odt] 4 mg PO Q8HR PRN #10 tab.rapdis 06/17/20 Unknown Rx


 


Ondansetron [Zofran Odt] 4 mg PO Q6H #20 tab.rapdis 05/13/21 Unknown Rx


 


cephALEXin [Keflex] 500 mg PO TID #28 cap 06/30/21 Unknown Rx


 


FLUoxetine HCL [Prozac] 20 mg PO DAILY 30 Days #30 capsule 07/01/21 Unknown Rx


 


traZODone [Desyrel] 50 mg PO QHS 30 Days #30 tab 07/01/21 Unknown Rx


 


OLANZapine [Olanzapine] 5 mg PO DAILY #30 tablet 10/28/21 Unknown Rx











                                    Allergies











Allergy/AdvReac Type Severity Reaction Status Date / Time


 


divalproex sodium Allergy  Unknown Verified 10/26/21 01:59





[From Depakote]     


 


quetiapine [From Seroquel] Allergy  Unknown Verified 10/26/21 01:59


 


risperidone [From Risperdal] Allergy  Unknown Verified 10/26/21 01:59














ED Review of Systems


ROS: 


Stated complaint: RX REFILL


Other details as noted in HPI





Comment: All other systems reviewed and negative


Constitutional: denies: chills, fever, malaise


Respiratory: denies: cough, shortness of breath


Cardiovascular: denies: chest pain


Gastrointestinal: denies: abdominal pain, nausea, vomiting





ED Past Medical Hx





- Past Medical History


Previous Medical History?: Yes


Hx Hypertension: Yes


Hx Diabetes: Yes


Hx Renal Disease: Yes (esrd)


Hx Psychiatric Treatment: Yes (bipolar, schizophrenia)


Additional medical history: Parkinsons disease





- Social History


Smoking Status: Never Smoker


Substance Use Type: None





- Medications


Home Medications: 


                                Home Medications











 Medication  Instructions  Recorded  Confirmed  Last Taken  Type


 


FLUoxetine HCL [FLUoxetine] 20 mg PO DAILY #7 tablet 04/16/20  Unknown Rx


 


OLANzapine [Zyprexa] 10 mg PO BID #14 tablet 04/16/20  Unknown Rx


 


hydrOXYzine PAMOATE [Vistaril] 50 mg PO Q12H #14 capsule 04/16/20  Unknown Rx


 


traZODone [Desyrel] 100 mg PO QHS #7 tablet 04/16/20  Unknown Rx


 


Ondansetron [Zofran Odt] 4 mg PO Q8HR PRN #10 tab.rapdis 06/17/20  Unknown Rx


 


Ondansetron [Zofran Odt] 4 mg PO Q6H #20 tab.rapdis 05/13/21  Unknown Rx


 


cephALEXin [Keflex] 500 mg PO TID #28 cap 06/30/21  Unknown Rx


 


FLUoxetine HCL [Prozac] 20 mg PO DAILY 30 Days #30 capsule 07/01/21  Unknown Rx


 


traZODone [Desyrel] 50 mg PO QHS 30 Days #30 tab 07/01/21  Unknown Rx


 


OLANZapine [Olanzapine] 5 mg PO DAILY #30 tablet 10/28/21  Unknown Rx














ED Physical Exam





- General


Limitations: No Limitations


General appearance: alert, in no apparent distress, other (Wearing hospital 

scrubs disheveled poor hygiene)





- Head


Head exam: Present: atraumatic, normocephalic





- Eye


Eye exam: Present: normal appearance





- ENT


ENT exam: Present: mucous membranes moist





- Neck


Neck exam: Present: normal inspection





- Respiratory


Respiratory exam: Present: normal lung sounds bilaterally.  Absent: respiratory 

distress, wheezes, rales, stridor





- Cardiovascular


Cardiovascular Exam: Present: regular rate, normal rhythm, normal heart sounds. 

 Absent: systolic murmur, diastolic murmur, rubs, gallop





- GI/Abdominal


GI/Abdominal exam: Present: soft, normal bowel sounds.  Absent: distended, 

tenderness, guarding, rebound





- Rectal


Rectal exam: Present: deferred





- Extremities Exam


Extremities exam: Present: normal inspection





- Back Exam


Back exam: Present: normal inspection





- Neurological Exam


Neurological exam: Present: alert, oriented X3, normal gait





- Psychiatric


Psychiatric exam: Present: flat affect





- Skin


Skin exam: Present: warm, dry, intact, normal color.  Absent: rash





ED Course





                                   Vital Signs











  10/30/21





  21:12


 


Temperature 98.6 F


 


Pulse Rate 84


 


Respiratory 18





Rate 


 


Blood Pressure 133/66


 


O2 Sat by Pulse 95





Oximetry 














ED Medical Decision Making





- Medical Decision Making





Mr. Grover is a 41-year-old male history of schizophrenia who states "I want 

something for my nerves.  I want to get checked out."





Patient has been evaluated numerous times this month in the emergency 

department, twice by psychiatric team.  Patient does not appear to be a harm to 

himself or others.  He is discharged to self-care.  I suspect homelessness is a 

motivation for him to be present in the emergency department





Mr. Loaiza recently received referral to outpatient resources 2 days ago.


Critical care attestation.: 


If time is entered above; I have spent that time in minutes in the direct care 

of this critically ill patient, excluding procedure time.








ED Disposition


Clinical Impression: 


 Schizophrenia





Disposition: 01 HOME / SELF CARE / HOMELESS


Is pt being admited?: No


Does the pt Need Aspirin: No


Condition: Stable

## 2021-11-04 NOTE — EMERGENCY DEPARTMENT REPORT
HPI





- General


Chief Complaint: Psych


Time Seen by Provider: 11/04/21 21:32





- HPI


HPI: 





This is a 41-year-old  male presents to the emergency department for a 

mental health evaluation.  He has a history of schizophrenia.  He complains of 

hallucinations in which he is having "nightmares coming out of my soul" while 

awake.  He admits to auditory hallucinations in which people are talking to him 

even though there is nobody around him.  He denies any suicidal or homicidal 

ideations.  Initially the patient denied being on any medications.  This patient

has been seen in this emergency department multiple times for mental health 

evaluations and was recently placed on olanzapine.  The patient says "I tried 

that and it worked a little but it is not good enough."





ED Past Medical Hx





- Past Medical History


Hx Hypertension: Yes


Hx Diabetes: Yes


Hx Renal Disease: Yes (esrd)


Hx Psychiatric Treatment: Yes (bipolar, schizophrenia)


Additional medical history: Parkinsons disease





- Social History


Smoking Status: Never Smoker


Substance Use Type: None





- Medications


Home Medications: 


                                Home Medications











 Medication  Instructions  Recorded  Confirmed  Last Taken  Type


 


FLUoxetine HCL [FLUoxetine] 20 mg PO DAILY #7 tablet 04/16/20  Unknown Rx


 


OLANzapine [Zyprexa] 10 mg PO BID #14 tablet 04/16/20  Unknown Rx


 


hydrOXYzine PAMOATE [Vistaril] 50 mg PO Q12H #14 capsule 04/16/20  Unknown Rx


 


traZODone [Desyrel] 100 mg PO QHS #7 tablet 04/16/20  Unknown Rx


 


Ondansetron [Zofran Odt] 4 mg PO Q8HR PRN #10 tab.rapdis 06/17/20  Unknown Rx


 


Ondansetron [Zofran Odt] 4 mg PO Q6H #20 tab.rapdis 05/13/21  Unknown Rx


 


cephALEXin [Keflex] 500 mg PO TID #28 cap 06/30/21  Unknown Rx


 


FLUoxetine HCL [Prozac] 20 mg PO DAILY 30 Days #30 capsule 07/01/21  Unknown Rx


 


traZODone [Desyrel] 50 mg PO QHS 30 Days #30 tab 07/01/21  Unknown Rx


 


OLANZapine [Olanzapine] 5 mg PO DAILY #30 tablet 10/28/21  Unknown Rx














ED Review of Systems


ROS: 


Stated complaint: STOMACH VIRUS, MH


Other details as noted in HPI





Comment: All other systems reviewed and negative


Constitutional: denies: chills, fever


Eyes: denies: eye pain, vision change


ENT: denies: ear pain, throat pain


Respiratory: denies: cough, shortness of breath


Cardiovascular: denies: chest pain, palpitations


Gastrointestinal: denies: abdominal pain, vomiting


Musculoskeletal: denies: back pain, arthralgia


Neurological: denies: headache, weakness


Psychiatric: anxiety, auditory hallucinations, visual hallucinations.  denies: 

homicidal thoughts, suicidal thoughts





Physical Exam





- Physical Exam


Vital Signs: 


                                   Vital Signs











  11/04/21





  21:18


 


Temperature 98.8 F


 


Pulse Rate 80


 


Respiratory 18





Rate 


 


Blood Pressure 132/69





[Left] 


 


O2 Sat by Pulse 97





Oximetry 











Physical Exam: 





GENERAL: The patient is well-developed well-nourished.


HENT: Normocephalic.  Atraumatic.    Patient has moist mucous membranes.


EYES: Extraocular motions are intact.


NECK: Supple. Trachea is midline.


CHEST/LUNGS: Clear to auscultation.  There is no respiratory distress noted.


HEART/CARDIOVASCULAR: Regular.  There is no tachycardia.  There is no murmur.


ABDOMEN: Abdomen is soft, nontender.  Patient has normal bowel sounds.


SKIN: Skin is warm and dry. 


NEURO: The patient is awake, alert, and oriented.  The patient is cooperative.  

Normal speech.


MUSCULOSKELETAL: There is no tenderness or deformity.  There is no limitation 

range of motion.





ED Course


                                   Vital Signs











  11/04/21





  21:18


 


Temperature 98.8 F


 


Pulse Rate 80


 


Respiratory 18





Rate 


 


Blood Pressure 132/69





[Left] 


 


O2 Sat by Pulse 97





Oximetry 














ED Medical Decision Making





- Lab Data


Result diagrams: 


                                 11/04/21 22:00





                                 11/04/21 22:00





                                   Lab Results











  11/04/21 11/04/21 11/04/21 Range/Units





  22:00 22:00 22:00 


 


WBC  10.6    (4.5-11.0)  K/mm3


 


RBC  4.80    (3.65-5.03)  M/mm3


 


Hgb  14.8    (11.8-15.2)  gm/dl


 


Hct  43.1    (35.5-45.6)  %


 


MCV  90    (84-94)  fl


 


MCH  31    (28-32)  pg


 


MCHC  34    (32-34)  %


 


RDW  15.7 H    (13.2-15.2)  %


 


Plt Count  191    (140-440)  K/mm3


 


Lymph % (Auto)  24.5    (13.4-35.0)  %


 


Mono % (Auto)  6.6    (0.0-7.3)  %


 


Eos % (Auto)  1.3    (0.0-4.3)  %


 


Baso % (Auto)  1.4    (0.0-1.8)  %


 


Lymph # (Auto)  2.6    (1.2-5.4)  K/mm3


 


Mono # (Auto)  0.7    (0.0-0.8)  K/mm3


 


Eos # (Auto)  0.1    (0.0-0.4)  K/mm3


 


Baso # (Auto)  0.2 H    (0.0-0.1)  K/mm3


 


Seg Neutrophils %  66.2    (40.0-70.0)  %


 


Seg Neutrophils #  7.0    (1.8-7.7)  K/mm3


 


Sodium   137   (137-145)  mmol/L


 


Potassium   4.0   (3.6-5.0)  mmol/L


 


Chloride   102.4   ()  mmol/L


 


Carbon Dioxide   25   (22-30)  mmol/L


 


Anion Gap   14   mmol/L


 


BUN   7 L   (9-20)  mg/dL


 


Creatinine   0.7 L   (0.8-1.3)  mg/dL


 


Estimated GFR   > 60   ml/min


 


BUN/Creatinine Ratio   10   %


 


Glucose   96   ()  mg/dL


 


Calcium   8.7   (8.4-10.2)  mg/dL


 


Urine Color     (Yellow)  


 


Urine Turbidity     (Clear)  


 


Urine pH     (5.0-7.0)  


 


Ur Specific Gravity     (1.003-1.030)  


 


Urine Protein     (Negative)  mg/dL


 


Urine Glucose (UA)     (Negative)  mg/dL


 


Urine Ketones     (Negative)  mg/dL


 


Urine Blood     (Negative)  


 


Urine Nitrite     (Negative)  


 


Urine Bilirubin     (Negative)  


 


Urine Urobilinogen     (<2.0)  mg/dL


 


Ur Leukocyte Esterase     (Negative)  


 


Urine WBC (Auto)     (0.0-6.0)  /HPF


 


Urine RBC (Auto)     (0.0-6.0)  /HPF


 


Urine Opiates Screen     


 


Urine Methadone Screen     


 


Ur Barbiturates Screen     


 


Ur Phencyclidine Scrn     


 


Ur Amphetamines Screen     


 


U Benzodiazepines Scrn     


 


Urine Cocaine Screen     


 


U Marijuana (THC) Screen     


 


Drugs of Abuse Note     


 


Plasma/Serum Alcohol    < 0.01  (0-0.07)  %














  11/04/21 11/04/21 Range/Units





  22:44 22:44 


 


WBC    (4.5-11.0)  K/mm3


 


RBC    (3.65-5.03)  M/mm3


 


Hgb    (11.8-15.2)  gm/dl


 


Hct    (35.5-45.6)  %


 


MCV    (84-94)  fl


 


MCH    (28-32)  pg


 


MCHC    (32-34)  %


 


RDW    (13.2-15.2)  %


 


Plt Count    (140-440)  K/mm3


 


Lymph % (Auto)    (13.4-35.0)  %


 


Mono % (Auto)    (0.0-7.3)  %


 


Eos % (Auto)    (0.0-4.3)  %


 


Baso % (Auto)    (0.0-1.8)  %


 


Lymph # (Auto)    (1.2-5.4)  K/mm3


 


Mono # (Auto)    (0.0-0.8)  K/mm3


 


Eos # (Auto)    (0.0-0.4)  K/mm3


 


Baso # (Auto)    (0.0-0.1)  K/mm3


 


Seg Neutrophils %    (40.0-70.0)  %


 


Seg Neutrophils #    (1.8-7.7)  K/mm3


 


Sodium    (137-145)  mmol/L


 


Potassium    (3.6-5.0)  mmol/L


 


Chloride    ()  mmol/L


 


Carbon Dioxide    (22-30)  mmol/L


 


Anion Gap    mmol/L


 


BUN    (9-20)  mg/dL


 


Creatinine    (0.8-1.3)  mg/dL


 


Estimated GFR    ml/min


 


BUN/Creatinine Ratio    %


 


Glucose    ()  mg/dL


 


Calcium    (8.4-10.2)  mg/dL


 


Urine Color  Straw   (Yellow)  


 


Urine Turbidity  Clear   (Clear)  


 


Urine pH  7.0   (5.0-7.0)  


 


Ur Specific Gravity  1.006   (1.003-1.030)  


 


Urine Protein  <15 mg/dl   (Negative)  mg/dL


 


Urine Glucose (UA)  Neg   (Negative)  mg/dL


 


Urine Ketones  Neg   (Negative)  mg/dL


 


Urine Blood  Neg   (Negative)  


 


Urine Nitrite  Neg   (Negative)  


 


Urine Bilirubin  Neg   (Negative)  


 


Urine Urobilinogen  < 2.0   (<2.0)  mg/dL


 


Ur Leukocyte Esterase  Neg   (Negative)  


 


Urine WBC (Auto)  < 1.0   (0.0-6.0)  /HPF


 


Urine RBC (Auto)  1.0   (0.0-6.0)  /HPF


 


Urine Opiates Screen   Presumptive negative  


 


Urine Methadone Screen   Presumptive negative  


 


Ur Barbiturates Screen   Presumptive negative  


 


Ur Phencyclidine Scrn   Presumptive negative  


 


Ur Amphetamines Screen   Presumptive negative  


 


U Benzodiazepines Scrn   Presumptive negative  


 


Urine Cocaine Screen   Presumptive negative  


 


U Marijuana (THC) Screen   Presumptive negative  


 


Drugs of Abuse Note   Disclamer  


 


Plasma/Serum Alcohol    (0-0.07)  %














- Medical Decision Making





This patient presents for a mental health evaluation.  He complains of 

hallucinations that he describes as "nightmares."  He denies any suicidal or 

homicidal ideations.  The patient has been made an ED hold for an evaluation 

from the psychiatric team in the morning.  However, he does not immediately fit 

criteria to be made a 1013 or require involuntary inpatient stabilization.





Labs have been unremarkable including CBC, metabolic panel, blood alcohol level,

 urinalysis and UDS.





Vital signs have been reassuring throughout his ED course thus far.





We will continue to monitor this patient during his ED course.  If the p

sychiatric team decides that he does need inpatient stabilization, I would 

consider this patient medically cleared for psychiatric placement.


Critical Care Time: No


Critical care attestation.: 


If time is entered above; I have spent that time in minutes in the direct care 

of this critically ill patient, excluding procedure time.








ED Disposition


Clinical Impression: 


 Schizophrenia, Encounter for medical screening examination





Disposition: 30 STILL A PATIENT


Is pt being admited?: No


Condition: Stable


Time of Disposition: 03:35

## 2021-11-05 NOTE — EVENT NOTE
Date: 11/05/21





The patient was evaluated in the emergency department for symptoms described in 

the history of present illness.  He/she was evaluated in the context of the 

global COVID-19 pandemic, which necessitated consideration that the patient 

might be at risk for infection with the virus that causes COVID-19.  

Institutional protocols and algorithms that pertain to the evaluation of 

patients at risk for COVID-19 are in a state of rapid change based on 

information released by regulatory bodies including the CDC and federal and 

state organizations.  These policies and algorithms were followed during the 

patient's care in the emergency department.  Please note that these policies, 

procedures and recommendations changed on a rapid basis.





Laboratory studies, vital signs, nursing documentation, ER documentation, and 

psychiatric documentation are reviewed and appreciated.  Nursing team reports no

acute events this morning or concerns.





The patient is awake and ambulating and does not appear to be in any acute 

distress.





The patient was deemed medically suitable for psychiatric disposition and 

placement during his initial ER evaluation.  The patient continues to remain 

medically suitable for psychiatric placement and disposition.


He is currently pending psychiatric placement.








Of note, I am very familiar with this patient.  This patient is a frequent 

utilizer and user of this emergency department.  He is unfortunately homeless 

and undomiciled, and typically presents to this department to seek shelter, 

and/or food.  Placing this patient on a 1013 hold or involuntary hold will 

reinforce maladaptive coping behavior, such as presenting to the emergency room 

for food, shelter, and also placing him on a 1013 hold will not resolved his 

homeless situation at this time.





I recently discharge this patient from the emergency room, and he has also been 

discharged by many of my colleagues.  I anticipate that the psychiatric team 

will recommend outpatient follow-up.  This patient has chronic hallucinations, 

but he appears to be at his baseline at this time


                                   Vital Signs











  11/04/21 11/04/21





  21:18 21:55


 


Temperature 98.8 F 


 


Pulse Rate 80 


 


Respiratory 18 





Rate  


 


Blood Pressure 132/69 





[Left]  


 


O2 Sat by Pulse 97 99





Oximetry  








                                   Lab Results











  11/04/21 11/04/21 11/04/21 Range/Units





  22:00 22:00 22:00 


 


WBC  10.6    (4.5-11.0)  K/mm3


 


RBC  4.80    (3.65-5.03)  M/mm3


 


Hgb  14.8    (11.8-15.2)  gm/dl


 


Hct  43.1    (35.5-45.6)  %


 


MCV  90    (84-94)  fl


 


MCH  31    (28-32)  pg


 


MCHC  34    (32-34)  %


 


RDW  15.7 H    (13.2-15.2)  %


 


Plt Count  191    (140-440)  K/mm3


 


Lymph % (Auto)  24.5    (13.4-35.0)  %


 


Mono % (Auto)  6.6    (0.0-7.3)  %


 


Eos % (Auto)  1.3    (0.0-4.3)  %


 


Baso % (Auto)  1.4    (0.0-1.8)  %


 


Lymph # (Auto)  2.6    (1.2-5.4)  K/mm3


 


Mono # (Auto)  0.7    (0.0-0.8)  K/mm3


 


Eos # (Auto)  0.1    (0.0-0.4)  K/mm3


 


Baso # (Auto)  0.2 H    (0.0-0.1)  K/mm3


 


Seg Neutrophils %  66.2    (40.0-70.0)  %


 


Seg Neutrophils #  7.0    (1.8-7.7)  K/mm3


 


Sodium   137   (137-145)  mmol/L


 


Potassium   4.0   (3.6-5.0)  mmol/L


 


Chloride   102.4   ()  mmol/L


 


Carbon Dioxide   25   (22-30)  mmol/L


 


Anion Gap   14   mmol/L


 


BUN   7 L   (9-20)  mg/dL


 


Creatinine   0.7 L   (0.8-1.3)  mg/dL


 


Estimated GFR   > 60   ml/min


 


BUN/Creatinine Ratio   10   %


 


Glucose   96   ()  mg/dL


 


Calcium   8.7   (8.4-10.2)  mg/dL


 


Urine Color     (Yellow)  


 


Urine Turbidity     (Clear)  


 


Urine pH     (5.0-7.0)  


 


Ur Specific Gravity     (1.003-1.030)  


 


Urine Protein     (Negative)  mg/dL


 


Urine Glucose (UA)     (Negative)  mg/dL


 


Urine Ketones     (Negative)  mg/dL


 


Urine Blood     (Negative)  


 


Urine Nitrite     (Negative)  


 


Urine Bilirubin     (Negative)  


 


Urine Urobilinogen     (<2.0)  mg/dL


 


Ur Leukocyte Esterase     (Negative)  


 


Urine WBC (Auto)     (0.0-6.0)  /HPF


 


Urine RBC (Auto)     (0.0-6.0)  /HPF


 


Urine Opiates Screen     


 


Urine Methadone Screen     


 


Ur Barbiturates Screen     


 


Ur Phencyclidine Scrn     


 


Ur Amphetamines Screen     


 


U Benzodiazepines Scrn     


 


Urine Cocaine Screen     


 


U Marijuana (THC) Screen     


 


Drugs of Abuse Note     


 


Plasma/Serum Alcohol    < 0.01  (0-0.07)  %














  11/04/21 11/04/21 Range/Units





  22:44 22:44 


 


WBC    (4.5-11.0)  K/mm3


 


RBC    (3.65-5.03)  M/mm3


 


Hgb    (11.8-15.2)  gm/dl


 


Hct    (35.5-45.6)  %


 


MCV    (84-94)  fl


 


MCH    (28-32)  pg


 


MCHC    (32-34)  %


 


RDW    (13.2-15.2)  %


 


Plt Count    (140-440)  K/mm3


 


Lymph % (Auto)    (13.4-35.0)  %


 


Mono % (Auto)    (0.0-7.3)  %


 


Eos % (Auto)    (0.0-4.3)  %


 


Baso % (Auto)    (0.0-1.8)  %


 


Lymph # (Auto)    (1.2-5.4)  K/mm3


 


Mono # (Auto)    (0.0-0.8)  K/mm3


 


Eos # (Auto)    (0.0-0.4)  K/mm3


 


Baso # (Auto)    (0.0-0.1)  K/mm3


 


Seg Neutrophils %    (40.0-70.0)  %


 


Seg Neutrophils #    (1.8-7.7)  K/mm3


 


Sodium    (137-145)  mmol/L


 


Potassium    (3.6-5.0)  mmol/L


 


Chloride    ()  mmol/L


 


Carbon Dioxide    (22-30)  mmol/L


 


Anion Gap    mmol/L


 


BUN    (9-20)  mg/dL


 


Creatinine    (0.8-1.3)  mg/dL


 


Estimated GFR    ml/min


 


BUN/Creatinine Ratio    %


 


Glucose    ()  mg/dL


 


Calcium    (8.4-10.2)  mg/dL


 


Urine Color  Straw   (Yellow)  


 


Urine Turbidity  Clear   (Clear)  


 


Urine pH  7.0   (5.0-7.0)  


 


Ur Specific Gravity  1.006   (1.003-1.030)  


 


Urine Protein  <15 mg/dl   (Negative)  mg/dL


 


Urine Glucose (UA)  Neg   (Negative)  mg/dL


 


Urine Ketones  Neg   (Negative)  mg/dL


 


Urine Blood  Neg   (Negative)  


 


Urine Nitrite  Neg   (Negative)  


 


Urine Bilirubin  Neg   (Negative)  


 


Urine Urobilinogen  < 2.0   (<2.0)  mg/dL


 


Ur Leukocyte Esterase  Neg   (Negative)  


 


Urine WBC (Auto)  < 1.0   (0.0-6.0)  /HPF


 


Urine RBC (Auto)  1.0   (0.0-6.0)  /HPF


 


Urine Opiates Screen   Presumptive negative  


 


Urine Methadone Screen   Presumptive negative  


 


Ur Barbiturates Screen   Presumptive negative  


 


Ur Phencyclidine Scrn   Presumptive negative  


 


Ur Amphetamines Screen   Presumptive negative  


 


U Benzodiazepines Scrn   Presumptive negative  


 


Urine Cocaine Screen   Presumptive negative  


 


U Marijuana (THC) Screen   Presumptive negative  


 


Drugs of Abuse Note   Disclamer  


 


Plasma/Serum Alcohol    (0-0.07)  %

## 2021-11-05 NOTE — CONSULTATION
History of Present Illness





- Reason for Consult


Consult date: 11/05/21


Reason for consult: anxiety





- History of Present Psychiatric Illness


The patient was seen today. He is known to me. The patient is lying down asleep.

He doesn't open his eyes to speak with me. I ask him how was he feeling he says 

"pretty good." When asking the patient what brought him to the hospital, he 

replied "my nerves." I asked the patient what did he mean by his nerves he says 

"'my anxiety is bad. I need something for it." The patient has told me this on 

previous visits. The patient denies SI/HI or hallucinations of any kind. He says

"no, I told you my nerves." He then rolls over and goes back to sleep.





REVIEW OF SYSTEMS


Constitutional: Negative for weight loss


ENT: Negative for stridor


Respiratory: Negative for cough or hemoptysis


All other systems reviewed and are negative


 


MENTAL STATUS EXAMINATION


General Appearance and Behavior: Age appropriate, good hygiene, wearing 

appropriate clothes, uncooperative polite with questioning.


Cooperation: cooperative


Psychomotor Behavior: Psychomotor agitation


Mood: good


Affect and affective range: Congruent


Thought Process: Self directed


Thought Content:No suicidal ideation


Speech: Normal


Intellectual Functioning: Average


Suicidal Ideation: Denied


Homicidal Ideation: Denied


hallucination: Denies


Impulse Control: intact


Insight and Judgment:  limited


Memory: Normal


Attention:Normal


Orientation: Alert and oriented





Generalized Anxiety Disorder


Current Visit: Yes   Status: Acute   





RECOMMENDATIONS


DC 1013


Continue home medications from previous visits. 


Risks, benefits and alternatives of medications discussed with the patient, 

questions answered and consent obtained from patient.


PSYCHOTHERAPY: Supportive psychotherapy provided


MEDICAL: Per primary team


DELIRIUM PRECAUTIONS: Please re-orient patient frequently, keep lights on during

the day, and minimize benzodiazepines and opiates as these medications could 

worsen patient's confusion.


SAFETY SITTER: Per medical team


DISPOSITION: Do not recommend acute inpatient psychiatric hospitalization at 

this time. The patient understands that if SI/HI or any fear of endangerment 

arise he is to seek immediate assistance. 


FOLLOW-UP: Will sign off


Follow up with out patient  within 7 days or sooner. 


Case staffed with Dr. Zaragoza


 








Medications and Allergies


                                    Allergies











Allergy/AdvReac Type Severity Reaction Status Date / Time


 


divalproex sodium Allergy  Unknown Verified 11/04/21 21:18





[From Depakote]     


 


quetiapine [From Seroquel] Allergy  Unknown Verified 11/04/21 21:18


 


risperidone [From Risperdal] Allergy  Unknown Verified 11/04/21 21:18











                                Home Medications











 Medication  Instructions  Recorded  Confirmed  Last Taken  Type


 


FLUoxetine HCL [FLUoxetine] 20 mg PO DAILY #7 tablet 04/16/20  Unknown Rx


 


OLANzapine [Zyprexa] 10 mg PO BID #14 tablet 04/16/20  Unknown Rx


 


hydrOXYzine PAMOATE [Vistaril] 50 mg PO Q12H #14 capsule 04/16/20  Unknown Rx


 


traZODone [Desyrel] 100 mg PO QHS #7 tablet 04/16/20  Unknown Rx


 


Ondansetron [Zofran Odt] 4 mg PO Q8HR PRN #10 tab.rapdis 06/17/20  Unknown Rx


 


Ondansetron [Zofran Odt] 4 mg PO Q6H #20 tab.rapdis 05/13/21  Unknown Rx


 


cephALEXin [Keflex] 500 mg PO TID #28 cap 06/30/21  Unknown Rx


 


FLUoxetine HCL [Prozac] 20 mg PO DAILY 30 Days #30 capsule 07/01/21  Unknown Rx


 


traZODone [Desyrel] 50 mg PO QHS 30 Days #30 tab 07/01/21  Unknown Rx


 


OLANZapine [Olanzapine] 5 mg PO DAILY #30 tablet 10/28/21  Unknown Rx














Mental Status Exam





- Vital signs


                                Last Vital Signs











Temp  98.8 F   11/04/21 21:18


 


Pulse  80   11/04/21 21:18


 


Resp  18   11/04/21 21:18


 


BP  132/69   11/04/21 21:18


 


Pulse Ox  99   11/04/21 21:55














Results


Result Diagrams: 


                                 11/04/21 22:00





                                 11/04/21 22:00


                              Abnormal lab results











  11/04/21 11/04/21 Range/Units





  22:00 22:00 


 


RDW  15.7 H   (13.2-15.2)  %


 


Baso # (Auto)  0.2 H   (0.0-0.1)  K/mm3


 


BUN   7 L  (9-20)  mg/dL


 


Creatinine   0.7 L  (0.8-1.3)  mg/dL








All other labs normal.

## 2021-11-07 NOTE — EMERGENCY DEPARTMENT REPORT
ED General Adult HPI





- General


Stated complaint: BILATERAL LEG PAIN


Time Seen by Provider: 11/07/21 02:53





- History of Present Illness


Initial comments: 





Patient presents with bilateral leg swelling.  He states both her legs hurting. 

This started 1 hour prior to arrival.  He was doing nothing specific when this 

occurred.  There is no trauma.  He has no fevers or chills per there is no cough

or congestion.  He states this is happened before and he was told that he has 

arthritis.  He did not take anything for pain.  He also states that his blood 

sugar has been elevated.  He admits that he has not necessarily been compliant 

with medication.  There is no fevers or chills.  Has no cough or congestion.  He

has no shortness of breath.  The pain is an aching pain in the legs.  It is 

constant.  It does not radiate or migrate.  It is worse with ambulation.





- Related Data


                                  Previous Rx's











 Medication  Instructions  Recorded  Last Taken  Type


 


FLUoxetine HCL [FLUoxetine] 20 mg PO DAILY #7 tablet 04/16/20 Unknown Rx


 


OLANzapine [Zyprexa] 10 mg PO BID #14 tablet 04/16/20 Unknown Rx


 


hydrOXYzine PAMOATE [Vistaril] 50 mg PO Q12H #14 capsule 04/16/20 Unknown Rx


 


traZODone [Desyrel] 100 mg PO QHS #7 tablet 04/16/20 Unknown Rx


 


Ondansetron [Zofran Odt] 4 mg PO Q8HR PRN #10 tab.rapdis 06/17/20 Unknown Rx


 


Ondansetron [Zofran Odt] 4 mg PO Q6H #20 tab.rapdis 05/13/21 Unknown Rx


 


cephALEXin [Keflex] 500 mg PO TID #28 cap 06/30/21 Unknown Rx


 


FLUoxetine HCL [Prozac] 20 mg PO DAILY 30 Days #30 capsule 07/01/21 Unknown Rx


 


traZODone [Desyrel] 50 mg PO QHS 30 Days #30 tab 07/01/21 Unknown Rx


 


OLANZapine [Olanzapine] 5 mg PO DAILY #30 tablet 10/28/21 Unknown Rx











                                    Allergies











Allergy/AdvReac Type Severity Reaction Status Date / Time


 


divalproex sodium Allergy  Unknown Verified 11/04/21 21:18





[From Depakote]     


 


quetiapine [From Seroquel] Allergy  Unknown Verified 11/04/21 21:18


 


risperidone [From Risperdal] Allergy  Unknown Verified 11/04/21 21:18














ED Review of Systems


ROS: 


Stated complaint: BILATERAL LEG PAIN


Other details as noted in HPI





Comment: All other systems reviewed and negative


Constitutional: denies: fever


Eyes: denies: vision change


ENT: denies: throat pain


Respiratory: denies: cough


Cardiovascular: denies: chest pain


Endocrine: denies: unexplained weight loss


Gastrointestinal: denies: abdominal pain


Genitourinary: denies: dysuria


Musculoskeletal: denies: back pain


Skin: denies: rash


Neurological: denies: headache


Hematological/Lymphatic: denies: easy bruising





ED Past Medical Hx





- Past Medical History


Hx Hypertension: Yes


Hx Diabetes: Yes


Hx Renal Disease: Yes (esrd)


Hx Psychiatric Treatment: Yes (bipolar, schizophrenia)


Additional medical history: Parkinsons disease





- Family History


Family history: diabetes, hypertension





- Social History


Smoking Status: Never Smoker


Substance Use Type: None





- Medications


Home Medications: 


                                Home Medications











 Medication  Instructions  Recorded  Confirmed  Last Taken  Type


 


FLUoxetine HCL [FLUoxetine] 20 mg PO DAILY #7 tablet 04/16/20  Unknown Rx


 


OLANzapine [Zyprexa] 10 mg PO BID #14 tablet 04/16/20  Unknown Rx


 


hydrOXYzine PAMOATE [Vistaril] 50 mg PO Q12H #14 capsule 04/16/20  Unknown Rx


 


traZODone [Desyrel] 100 mg PO QHS #7 tablet 04/16/20  Unknown Rx


 


Ondansetron [Zofran Odt] 4 mg PO Q8HR PRN #10 tab.rapdis 06/17/20  Unknown Rx


 


Ondansetron [Zofran Odt] 4 mg PO Q6H #20 tab.rapdis 05/13/21  Unknown Rx


 


cephALEXin [Keflex] 500 mg PO TID #28 cap 06/30/21  Unknown Rx


 


FLUoxetine HCL [Prozac] 20 mg PO DAILY 30 Days #30 capsule 07/01/21  Unknown Rx


 


traZODone [Desyrel] 50 mg PO QHS 30 Days #30 tab 07/01/21  Unknown Rx


 


OLANZapine [Olanzapine] 5 mg PO DAILY #30 tablet 10/28/21  Unknown Rx














ED Physical Exam





- General


Limitations: No Limitations, Other (Pulse ox noted and normal)


General appearance: alert, in no apparent distress, other (Disheveled and 

unkempt)





- Head


Head exam: Present: atraumatic, normocephalic





- Eye


Eye exam: Present: normal appearance, EOMI.  Absent: scleral icterus





- ENT


ENT exam: Present: normal exam, normal external ear exam





- Neck


Neck exam: Present: normal inspection.  Absent: meningismus





- Respiratory


Respiratory exam: Present: normal lung sounds bilaterally.  Absent: respiratory 

distress





- Cardiovascular


Cardiovascular Exam: Present: regular rate, normal rhythm





- GI/Abdominal


GI/Abdominal exam: Present: soft.  Absent: tenderness





- Extremities Exam


Extremities exam: Present: normal inspection, normal capillary refill.  Absent: 

pedal edema, calf tenderness





- Back Exam


Back exam: Absent: CVA tenderness (R), CVA tenderness (L)





- Neurological Exam


Neurological exam: Present: alert, oriented X3, CN II-XII intact, normal gait.  

Absent: motor sensory deficit





- Psychiatric


Psychiatric exam: Present: normal affect, normal mood





- Skin


Skin exam: Present: warm, dry





ED Course





- Reevaluation(s)


Reevaluation #1: 





11/07/21 03:18


Accu-Chek and Tylenol were ordered.  Old records reviewed.





ED Medical Decision Making





- Medical Decision Making





Patient presents with bilateral leg pain.  He states that they are swollen.  I 

do not appreciate any edema.  There is no warmth or erythema.  There is 

certainly no evidence of cellulitis or infection.  He does not have unilateral 

leg edema that would suggest DVT.  There is no trauma that would suggest injury 

or fracture.  Patient's glucose has been noted.  This can be managed as an 

outpatient.  He certainly does not require emergent intervention.  He does not 

have any evidence of necrotizing fasciitis given the fact that he has bilateral 

leg pain.  It is unlikely this represents any type of compartment syndrome given

 the fact that there is no injury.  He does not have a petechial or purpuric 

rash.  Patient was treated symptomatically.  He states that this is related to 

arthritis.  He can use Tylenol over-the-counter.  He was subsequently 

discharged.


Critical Care Time: No


Critical care attestation.: 


If time is entered above; I have spent that time in minutes in the direct care 

of this critically ill patient, excluding procedure time.








ED Disposition


Clinical Impression: 


 Bilateral leg pain





Disposition: 01 HOME / SELF CARE / HOMELESS


Is pt being admited?: No


Condition: Stable


Instructions:  How to Use Cold Therapy, Easy-to-Read, Pain Without a Known Cause


Additional Instructions: 


Ice and elevate.  Return for problems.  Follow-up with a regular doctor.  If you

 do not have a regular doctor, follow-up with the referral physician.  Use 

Tylenol and ibuprofen over-the-counter for your pain.


Referrals: 


PRIMARY CARE,MD [Referring] - 3-5 Days


JOANNE HAWK MD [Staff Physician] - 3-5 Days

## 2021-11-08 NOTE — EMERGENCY DEPARTMENT REPORT
ED Psych HPI





- General


Chief Complaint: Psych


Stated Complaint: SI/MH


Time Seen by Provider: 11/07/21 23:50


Source: patient


Mode of arrival: Ambulatory





- History of Present Illness


Initial Comments: 





Patient presents because he is hearing voices.  He states that he is seeing 

things as well.  Since 2000, he has been seeing things and hearing voices.  The 

things that he sees are people.  He hears voices and including voices that are 

belligerent and combative.  These are not command hallucinations.  He has not 

been told to harm himself or anyone else.  He is not suicidal or homicidal.  I 

have asked him this repeatedly because he initially reported that he was 

suicidal.  He tells me that he is not suicidal.  Patient states that normally he

gets some medication to help him sleep and the symptoms go away.  He tells me 

that he normally takes Seroquel despite the fact that he had listed that as an 

allergy previously.  Patient did not report any of this last night when I saw 

him.  He states that all of the symptoms started tonight.





- Related Data


                                  Previous Rx's











 Medication  Instructions  Recorded  Last Taken  Type


 


FLUoxetine HCL [FLUoxetine] 20 mg PO DAILY #7 tablet 04/16/20 Unknown Rx


 


OLANzapine [Zyprexa] 10 mg PO BID #14 tablet 04/16/20 Unknown Rx


 


hydrOXYzine PAMOATE [Vistaril] 50 mg PO Q12H #14 capsule 04/16/20 Unknown Rx


 


traZODone [Desyrel] 100 mg PO QHS #7 tablet 04/16/20 Unknown Rx


 


Ondansetron [Zofran Odt] 4 mg PO Q8HR PRN #10 tab.rapdis 06/17/20 Unknown Rx


 


Ondansetron [Zofran Odt] 4 mg PO Q6H #20 tab.rapdis 05/13/21 Unknown Rx


 


cephALEXin [Keflex] 500 mg PO TID #28 cap 06/30/21 Unknown Rx


 


FLUoxetine HCL [Prozac] 20 mg PO DAILY 30 Days #30 capsule 07/01/21 Unknown Rx


 


traZODone [Desyrel] 50 mg PO QHS 30 Days #30 tab 07/01/21 Unknown Rx


 


OLANZapine [Olanzapine] 5 mg PO DAILY #30 tablet 10/28/21 Unknown Rx











                                    Allergies











Allergy/AdvReac Type Severity Reaction Status Date / Time


 


divalproex sodium Allergy  Unknown Verified 11/07/21 23:46





[From Depakote]     


 


quetiapine [From Seroquel] Allergy  Unknown Verified 11/07/21 23:46


 


risperidone [From Risperdal] Allergy  Unknown Verified 11/07/21 23:46














ED Review of Systems


ROS: 


Stated complaint: SI/MH


Other details as noted in HPI





Comment: All other systems reviewed and negative


Constitutional: denies: fever


Eyes: denies: vision change


ENT: denies: throat pain


Respiratory: denies: cough


Cardiovascular: denies: chest pain


Endocrine: denies: unexplained weight loss


Gastrointestinal: denies: abdominal pain


Genitourinary: denies: dysuria


Musculoskeletal: denies: back pain


Skin: denies: rash


Neurological: denies: headache


Psychiatric: as per HPI.  denies: homicidal thoughts, suicidal thoughts


Hematological/Lymphatic: denies: easy bruising





ED Past Medical Hx





- Past Medical History


Hx Hypertension: Yes


Hx Diabetes: Yes


Hx Renal Disease: Yes (esrd)


Hx Psychiatric Treatment: Yes (bipolar, schizophrenia)


Additional medical history: Parkinsons disease





- Family History


Family history: other (Psychiatric disease)





- Social History


Smoking Status: Never Smoker


Substance Use Type: None





- Medications


Home Medications: 


                                Home Medications











 Medication  Instructions  Recorded  Confirmed  Last Taken  Type


 


FLUoxetine HCL [FLUoxetine] 20 mg PO DAILY #7 tablet 04/16/20  Unknown Rx


 


OLANzapine [Zyprexa] 10 mg PO BID #14 tablet 04/16/20  Unknown Rx


 


hydrOXYzine PAMOATE [Vistaril] 50 mg PO Q12H #14 capsule 04/16/20  Unknown Rx


 


traZODone [Desyrel] 100 mg PO QHS #7 tablet 04/16/20  Unknown Rx


 


Ondansetron [Zofran Odt] 4 mg PO Q8HR PRN #10 tab.rapdis 06/17/20  Unknown Rx


 


Ondansetron [Zofran Odt] 4 mg PO Q6H #20 tab.rapdis 05/13/21  Unknown Rx


 


cephALEXin [Keflex] 500 mg PO TID #28 cap 06/30/21  Unknown Rx


 


FLUoxetine HCL [Prozac] 20 mg PO DAILY 30 Days #30 capsule 07/01/21  Unknown Rx


 


traZODone [Desyrel] 50 mg PO QHS 30 Days #30 tab 07/01/21  Unknown Rx


 


OLANZapine [Olanzapine] 5 mg PO DAILY #30 tablet 10/28/21  Unknown Rx














ED Physical Exam





- General


Limitations: No Limitations, Other (Pulse ox noted and normal)


General appearance: alert, in no apparent distress, other (Disheveled and 

unkempt)





- Head


Head exam: Present: atraumatic, normocephalic, normal inspection





- Eye


Eye exam: Present: normal appearance, EOMI





- ENT


ENT exam: Present: normal exam, mucous membranes moist





- Neck


Neck exam: Present: normal inspection.  Absent: meningismus





- Respiratory


Respiratory exam: Present: normal lung sounds bilaterally.  Absent: respiratory 

distress





- Cardiovascular


Cardiovascular Exam: Present: regular rate, normal rhythm





- GI/Abdominal


GI/Abdominal exam: Present: soft.  Absent: tenderness





- Extremities Exam


Extremities exam: Present: normal capillary refill





- Back Exam


Back exam: Present: full ROM





- Neurological Exam


Neurological exam: Present: alert, oriented X3, normal gait.  Absent: motor 

sensory deficit





- Psychiatric


Psychiatric exam: Present: normal affect, normal mood





- Skin


Skin exam: Present: warm, dry





ED Course





                                   Vital Signs











  11/07/21





  23:47


 


Temperature 98.4 F


 


Pulse Rate 82


 


Respiratory 18





Rate 


 


Blood Pressure 116/78





[Left] 


 


O2 Sat by Pulse 98





Oximetry 














- Reevaluation(s)


Reevaluation #1: 





11/08/21 00:23


Patient was seen as above.  I have given him a dose of Zyprexa.  He was 

subsequently discharged.  Again, he is not suicidal or homicidal.





ED Medical Decision Making





- Medical Decision Making





Patient presents with reports of hearing voices.  He also reports seeing things.

  He has a history of hallucinations.  He is not suicidal homicidal.  Is not 

responding to extraneous stimuli.  He does not appear to be acutely psychotic.  

He is not agitated or belligerent.  Patient states that he normally is able to 

sleep and this goes away.  He will be treated symptomatically and discharged.  

He has been given outpatient follow-up.  I did review old records and he does 

have a history of this.  It should be noted that he is here frequently for 

various different complaints and I have reviewed these as well.  Again, he is 

stated that he is not actively suicidal or homicidal.


Critical Care Time: No


Critical care attestation.: 


If time is entered above; I have spent that time in minutes in the direct care 

of this critically ill patient, excluding procedure time.








ED Disposition


Clinical Impression: 


 Hallucinations





Disposition: 01 HOME / SELF CARE / HOMELESS


Is pt being admited?: No


Condition: Stable


Additional Instructions: 


Take your medications.  See a regular doctor.  Follow-up with a regular doctor. 

 Return for any new problems or concerns.


Referrals: 


PRIMARY CARE,MD [Referring] - 3-5 Days


ZACHARY STAPLETON MD [Staff Physician] - 3-5 Days

## 2021-11-10 NOTE — EMERGENCY DEPARTMENT REPORT
ED General Adult HPI





- General


Chief complaint: Pain General


Stated complaint: PAIN ALL OVER


Time Seen by Provider: 11/10/21 05:08


Source: EMS


Mode of arrival: Ambulatory


Limitations: No Limitations





- History of Present Illness


Initial comments: 


Patient 41-year-old white male with history of depression and schizophrenia 

patient is homeless. Patient presents tonight for generalized pain all over. 

States is cold cold out and he is achy all over. Patient denies fevers or chills

he denies falls injury or trauma. Patient alert oriented x3 and amatory with 

steady gait there are no obvious wounds or bruises or lacerations. Patient now 

endorses this is chronic pain. Pain is exacerbated by activity and moving. Pain 

is relieved by nothing tried..








- Related Data


                                  Previous Rx's











 Medication  Instructions  Recorded  Last Taken  Type


 


FLUoxetine HCL [FLUoxetine] 20 mg PO DAILY #7 tablet 04/16/20 Unknown Rx


 


OLANzapine [Zyprexa] 10 mg PO BID #14 tablet 04/16/20 Unknown Rx


 


hydrOXYzine PAMOATE [Vistaril] 50 mg PO Q12H #14 capsule 04/16/20 Unknown Rx


 


traZODone [Desyrel] 100 mg PO QHS #7 tablet 04/16/20 Unknown Rx


 


Ondansetron [Zofran Odt] 4 mg PO Q8HR PRN #10 tab.rapdis 06/17/20 Unknown Rx


 


Ondansetron [Zofran Odt] 4 mg PO Q6H #20 tab.rapdis 05/13/21 Unknown Rx


 


cephALEXin [Keflex] 500 mg PO TID #28 cap 06/30/21 Unknown Rx


 


FLUoxetine HCL [Prozac] 20 mg PO DAILY 30 Days #30 capsule 07/01/21 Unknown Rx


 


traZODone [Desyrel] 50 mg PO QHS 30 Days #30 tab 07/01/21 Unknown Rx


 


OLANZapine [Olanzapine] 5 mg PO DAILY #30 tablet 10/28/21 Unknown Rx


 


Acetaminophen [Acetaminophen 8 650 mg PO QID PRN #30 tablet.er 11/10/21 Unknown 

Rx





Hour]    











                                    Allergies











Allergy/AdvReac Type Severity Reaction Status Date / Time


 


divalproex sodium Allergy  Unknown Verified 11/09/21 23:40





[From Depakote]     


 


quetiapine [From Seroquel] Allergy  Unknown Verified 11/09/21 23:40


 


risperidone [From Risperdal] Allergy  Unknown Verified 11/09/21 23:40














ED Review of Systems


ROS: 


Stated complaint: PAIN ALL OVER


Other details as noted in HPI





Constitutional: denies: chills, fever


Eyes: denies: eye pain, eye discharge, vision change


ENT: denies: ear pain, throat pain


Respiratory: no symptoms reported


Cardiovascular: denies: chest pain, palpitations


Endocrine: no symptoms reported


Gastrointestinal: denies: abdominal pain, nausea, diarrhea


Genitourinary: denies: urgency, dysuria


Musculoskeletal: arthralgia, myalgia.  denies: back pain, joint swelling


Skin: denies: rash, lesions


Neurological: denies: headache, weakness, paresthesias


Psychiatric: denies: anxiety, depression


Hematological/Lymphatic: denies: easy bleeding, easy bruising





ED Past Medical Hx





- Past Medical History


Hx Hypertension: Yes


Hx Diabetes: Yes


Hx Renal Disease: Yes (esrd)


Hx Psychiatric Treatment: Yes (bipolar, schizophrenia)


Additional medical history: Parkinsons disease





- Social History


Smoking Status: Never Smoker


Substance Use Type: None





- Medications


Home Medications: 


                                Home Medications











 Medication  Instructions  Recorded  Confirmed  Last Taken  Type


 


FLUoxetine HCL [FLUoxetine] 20 mg PO DAILY #7 tablet 04/16/20  Unknown Rx


 


OLANzapine [Zyprexa] 10 mg PO BID #14 tablet 04/16/20  Unknown Rx


 


hydrOXYzine PAMOATE [Vistaril] 50 mg PO Q12H #14 capsule 04/16/20  Unknown Rx


 


traZODone [Desyrel] 100 mg PO QHS #7 tablet 04/16/20  Unknown Rx


 


Ondansetron [Zofran Odt] 4 mg PO Q8HR PRN #10 tab.rapdis 06/17/20  Unknown Rx


 


Ondansetron [Zofran Odt] 4 mg PO Q6H #20 tab.rapdis 05/13/21  Unknown Rx


 


cephALEXin [Keflex] 500 mg PO TID #28 cap 06/30/21  Unknown Rx


 


FLUoxetine HCL [Prozac] 20 mg PO DAILY 30 Days #30 capsule 07/01/21  Unknown Rx


 


traZODone [Desyrel] 50 mg PO QHS 30 Days #30 tab 07/01/21  Unknown Rx


 


OLANZapine [Olanzapine] 5 mg PO DAILY #30 tablet 10/28/21  Unknown Rx


 


Acetaminophen [Acetaminophen 8 650 mg PO QID PRN #30 tablet.er 11/10/21  Unknown

 Rx





Hour]     














ED Physical Exam





- General


Limitations: No Limitations


General appearance: alert, in no apparent distress





- Head


Head exam: Present: normocephalic, normal inspection





- Eye


Eye exam: Present: normal appearance, PERRL, EOMI


Pupils: Present: normal accommodation





- ENT


ENT exam: Present: mucous membranes moist





- Neck


Neck exam: Present: normal inspection, full ROM.  Absent: tenderness





- Respiratory


Respiratory exam: Present: normal lung sounds bilaterally.  Absent: respiratory 

distress, wheezes, chest wall tenderness





- Cardiovascular


Cardiovascular Exam: Present: regular rate, normal rhythm, normal heart sounds. 

Absent: systolic murmur, diastolic murmur, rubs, gallop





- GI/Abdominal


GI/Abdominal exam: Present: soft, normal bowel sounds.  Absent: distended, 

tenderness





- Rectal


Rectal exam: Present: deferred





- Extremities Exam


Extremities exam: Present: normal inspection, full ROM, normal capillary refill.

 Absent: tenderness, joint swelling





- Back Exam


Back exam: Present: normal inspection, full ROM.  Absent: CVA tenderness (R), 

CVA tenderness (L)





- Neurological Exam


Neurological exam: Present: alert, oriented X3, CN II-XII intact, normal gait.  

Absent: motor sensory deficit





- Expanded Neurological Exam


  ** Expanded


Patient oriented to: Present: person, place


Speech: Present: fluid speech


Motor strength exam: RUE: 5, LUE: 5, RLE: 5, LLE: 5


Best Eye Response ([place]): (4) open spontaneously


Best Motor Response ([place]): (6) obeys commands


Best Verbal Response ([place]): (5) oriented


Umair Total: 15





- Psychiatric


Psychiatric exam: Present: normal affect, normal mood





- Skin


Skin exam: Present: warm, dry, intact, normal color.  Absent: rash





ED Course





                                   Vital Signs











  11/09/21





  23:40


 


Temperature 97.8 F


 


Pulse Rate 82


 


Respiratory 16





Rate 


 


Blood Pressure 141/54





[Left] 


 


O2 Sat by Pulse 99





Oximetry 














ED Medical Decision Making





- Medical Decision Making


This is generalized body aches. Plan NSAIDs as needed pain. Follow-up with your 

doctor in 2 to 3 days. Return to the emergency department if symptoms worsen. 

Patient verbalized agreement and understanding with discharge plan. Patient will

 be DC'd home in stable condition at this time.





Critical care attestation.: 


If time is entered above; I have spent that time in minutes in the direct care 

of this critically ill patient, excluding procedure time.








ED Disposition


Clinical Impression: 


 Musculoskeletal pain





Disposition: 01 HOME / SELF CARE / HOMELESS


Is pt being admited?: No


Does the pt Need Aspirin: No


Condition: Stable


Instructions:  Musculoskeletal Pain


Additional Instructions: 


Take all medications as prescribed, use moist heat therapy, follow-up with your 

primary care doctor in 2 to 3 days.


Prescriptions: 


Acetaminophen [Acetaminophen 8 Hour] 650 mg PO QID PRN #30 tablet.er


 PRN Reason: pain


Referrals: 


[facility] [Provider Group] - 3-5 Days


Time of Disposition: 05:27

## 2021-12-01 NOTE — EMERGENCY DEPARTMENT REPORT
ED General Adult HPI





- General


Chief complaint: Chest Pain


Stated complaint: CHEST PAIN


Time Seen by Provider: 12/01/21 03:05


Source: patient


Mode of arrival: Ambulatory


Limitations: No Limitations





- History of Present Illness


Initial comments: 


Patient 41-year-old white male with history of homelessness.  He presents 

tonight for vague chest pain.  Patient denies shortness of breath there is no 

dizziness there is no headache there is no numbness or tingling there is no 

nausea no vomiting.  Patient states symptoms onset with cough movement tonight. 

Patient states symptoms are exacerbated by nothing.  Symptoms are relieved by 

nothing tried.  Patient rates symptoms at 2/10 at this time.  Patient arrived to

the ED via self from self.  Scented to ED patient is ambulatory with no acute 

distress,  





- Related Data


                                  Previous Rx's











 Medication  Instructions  Recorded  Last Taken  Type


 


FLUoxetine HCL [FLUoxetine] 20 mg PO DAILY #7 tablet 04/16/20 Unknown Rx


 


OLANzapine [Zyprexa] 10 mg PO BID #14 tablet 04/16/20 Unknown Rx


 


hydrOXYzine PAMOATE [Vistaril] 50 mg PO Q12H #14 capsule 04/16/20 Unknown Rx


 


traZODone [Desyrel] 100 mg PO QHS #7 tablet 04/16/20 Unknown Rx


 


Ondansetron [Zofran Odt] 4 mg PO Q8HR PRN #10 tab.rapdis 06/17/20 Unknown Rx


 


Ondansetron [Zofran Odt] 4 mg PO Q6H #20 tab.rapdis 05/13/21 Unknown Rx


 


cephALEXin [Keflex] 500 mg PO TID #28 cap 06/30/21 Unknown Rx


 


FLUoxetine HCL [Prozac] 20 mg PO DAILY 30 Days #30 capsule 07/01/21 Unknown Rx


 


traZODone [Desyrel] 50 mg PO QHS 30 Days #30 tab 07/01/21 Unknown Rx


 


OLANZapine [Olanzapine] 5 mg PO DAILY #30 tablet 10/28/21 Unknown Rx


 


Acetaminophen [Acetaminophen 8 650 mg PO QID PRN #30 tablet.er 11/10/21 Unknown 

Rx





Hour]    


 


Ibuprofen [Motrin 800 MG tab] 800 mg PO Q8HR PRN #30 tablet 12/01/21 Unknown Rx











                                    Allergies











Allergy/AdvReac Type Severity Reaction Status Date / Time


 


divalproex sodium Allergy  Unknown Verified 12/01/21 02:29





[From Depakote]     


 


quetiapine [From Seroquel] Allergy  Unknown Verified 12/01/21 02:29


 


risperidone [From Risperdal] Allergy  Unknown Verified 12/01/21 02:29














ED Review of Systems


ROS: 


Stated complaint: CHEST PAIN


Other details as noted in HPI





Constitutional: denies: chills, fever


Eyes: denies: eye pain, eye discharge, vision change


ENT: denies: ear pain, throat pain


Respiratory: denies: cough, shortness of breath, wheezing


Cardiovascular: denies: chest pain, palpitations


Endocrine: no symptoms reported


Gastrointestinal: denies: abdominal pain, nausea, diarrhea


Genitourinary: denies: urgency, dysuria


Musculoskeletal: denies: back pain, joint swelling, arthralgia


Skin: denies: rash, lesions


Neurological: denies: headache, weakness, paresthesias


Psychiatric: denies: anxiety, depression


Hematological/Lymphatic: denies: easy bleeding, easy bruising





ED Past Medical Hx





- Past Medical History


Hx Hypertension: Yes


Hx Diabetes: Yes


Hx Renal Disease: Yes (esrd)


Hx Psychiatric Treatment: Yes (bipolar, schizophrenia)


Additional medical history: Parkinsons disease





- Social History


Smoking Status: Never Smoker


Substance Use Type: None





- Medications


Home Medications: 


                                Home Medications











 Medication  Instructions  Recorded  Confirmed  Last Taken  Type


 


FLUoxetine HCL [FLUoxetine] 20 mg PO DAILY #7 tablet 04/16/20  Unknown Rx


 


OLANzapine [Zyprexa] 10 mg PO BID #14 tablet 04/16/20  Unknown Rx


 


hydrOXYzine PAMOATE [Vistaril] 50 mg PO Q12H #14 capsule 04/16/20  Unknown Rx


 


traZODone [Desyrel] 100 mg PO QHS #7 tablet 04/16/20  Unknown Rx


 


Ondansetron [Zofran Odt] 4 mg PO Q8HR PRN #10 tab.rapdis 06/17/20  Unknown Rx


 


Ondansetron [Zofran Odt] 4 mg PO Q6H #20 tab.rapdis 05/13/21  Unknown Rx


 


cephALEXin [Keflex] 500 mg PO TID #28 cap 06/30/21  Unknown Rx


 


FLUoxetine HCL [Prozac] 20 mg PO DAILY 30 Days #30 capsule 07/01/21  Unknown Rx


 


traZODone [Desyrel] 50 mg PO QHS 30 Days #30 tab 07/01/21  Unknown Rx


 


OLANZapine [Olanzapine] 5 mg PO DAILY #30 tablet 10/28/21  Unknown Rx


 


Acetaminophen [Acetaminophen 8 650 mg PO QID PRN #30 tablet.er 11/10/21  Unknown

 Rx





Hour]     


 


Ibuprofen [Motrin 800 MG tab] 800 mg PO Q8HR PRN #30 tablet 12/01/21  Unknown Rx














ED Physical Exam





- General


Limitations: No Limitations


General appearance: alert, in no apparent distress





- Head


Head exam: Present: normocephalic, normal inspection





- Eye


Eye exam: Present: normal appearance, PERRL, EOMI


Pupils: Present: normal accommodation





- ENT


ENT exam: Present: mucous membranes moist





- Neck


Neck exam: Present: normal inspection.  Absent: tenderness





- Respiratory


Respiratory exam: Present: normal lung sounds bilaterally.  Absent: respiratory 

distress, wheezes, stridor, chest wall tenderness





- Cardiovascular


Cardiovascular Exam: Present: regular rate, normal rhythm, normal heart sounds. 

Absent: systolic murmur, diastolic murmur, rubs, gallop





- GI/Abdominal


GI/Abdominal exam: Present: soft, normal bowel sounds.  Absent: distended, 

tenderness





- Rectal


Rectal exam: Present: deferred





- Extremities Exam


Extremities exam: Present: normal inspection, full ROM, normal capillary refill





- Back Exam


Back exam: Present: normal inspection





- Neurological Exam


Neurological exam: Present: alert, oriented X3, CN II-XII intact, normal gait





- Psychiatric


Psychiatric exam: Present: normal affect, normal mood





- Skin


Skin exam: Present: warm, dry, intact, normal color.  Absent: rash





ED Course





                                   Vital Signs











  12/01/21





  02:29


 


Temperature 98.7 F


 


Pulse Rate 78


 


Respiratory 18





Rate 


 


Blood Pressure 147/70





[Left] 


 


O2 Sat by Pulse 98





Oximetry 














ED Medical Decision Making





- Medical Decision Making


Patient declines labs refuses x-ray refuses EKG.  States he just wants to rest 

and sleep.  This is likely secondary gain.  Patient was signed out AMA at this 

time patient is alert oriented x3 patient demonstrates safe decision-making 

capacity.  There is no acute distress shortness of breath or nausea or vomiting





Critical care attestation.: 


If time is entered above; I have spent that time in minutes in the direct care 

of this critically ill patient, excluding procedure time.








ED Disposition


Clinical Impression: 


 Musculoskeletal pain





Disposition: 01 HOME / SELF CARE / HOMELESS


Is pt being admited?: No


Does the pt Need Aspirin: No


Condition: Stable


Instructions:  Musculoskeletal Pain


Additional Instructions: 


Take all medications as prescribed, follow-up with your doctor in 2 to 3 days.  

Return to emergency department should symptoms worsen.


Prescriptions: 


Ibuprofen [Motrin 800 MG tab] 800 mg PO Q8HR PRN #30 tablet


 PRN Reason: pain


Referrals: 


PRIMARY CARE,MD [Primary Care Provider] - 3-5 Days


Forms:  Work/School Release Form(ED)


Time of Disposition: 05:46

## 2022-03-11 NOTE — EMERGENCY DEPARTMENT REPORT
ED General Adult HPI





- General


Chief complaint: Psych


Stated complaint: psych


Time Seen by Provider: 03/11/22 00:41


Source: patient, EMS ( EMS documentation not available at time of chart 

dictation ), RN notes reviewed, old records reviewed


Mode of arrival: Stretcher





- History of Present Illness


Initial comments: 





The patient is a 41-year-old gentleman with a history of homelessness and 

psychiatric disease, who was brought to the hospital by emergency medical 

services.  Apparently, the patient was walking around talking to himself.  The 

patient denies physical pain.  The patient makes no complaint of homicidality, 

suicidality or overdose.  The patient endorses no complaints at this time.


-: This evening





- Related Data


                                  Previous Rx's











 Medication  Instructions  Recorded  Last Taken  Type


 


FLUoxetine HCL [FLUoxetine] 20 mg PO DAILY #7 tablet 04/16/20 Unknown Rx


 


OLANzapine [Zyprexa] 10 mg PO BID #14 tablet 04/16/20 Unknown Rx


 


hydrOXYzine PAMOATE [Vistaril] 50 mg PO Q12H #14 capsule 04/16/20 Unknown Rx


 


traZODone [Desyrel] 100 mg PO QHS #7 tablet 04/16/20 Unknown Rx


 


Ondansetron [Zofran Odt] 4 mg PO Q8HR PRN #10 tab.rapdis 06/17/20 Unknown Rx


 


Ondansetron [Zofran Odt] 4 mg PO Q6H #20 tab.rapdis 05/13/21 Unknown Rx


 


cephALEXin [Keflex] 500 mg PO TID #28 cap 06/30/21 Unknown Rx


 


FLUoxetine HCL [Prozac] 20 mg PO DAILY 30 Days #30 capsule 07/01/21 Unknown Rx


 


traZODone [Desyrel] 50 mg PO QHS 30 Days #30 tab 07/01/21 Unknown Rx


 


OLANZapine [Olanzapine] 5 mg PO DAILY #30 tablet 10/28/21 Unknown Rx


 


Acetaminophen [Acetaminophen 8 650 mg PO QID PRN #30 tablet.er 11/10/21 Unknown 

Rx





Hour]    


 


Ibuprofen [Motrin 800 MG tab] 800 mg PO Q8HR PRN #30 tablet 12/01/21 Unknown Rx











                                    Allergies











Allergy/AdvReac Type Severity Reaction Status Date / Time


 


divalproex sodium Allergy  Unknown Verified 12/01/21 02:29





[From Depakote]     


 


quetiapine [From Seroquel] Allergy  Unknown Verified 12/01/21 02:29


 


risperidone [From Risperdal] Allergy  Unknown Verified 12/01/21 02:29














ED Review of Systems


ROS: 


Stated complaint: AMS


Other details as noted in HPI





Constitutional: see HPI


Eyes: as per HPI


ENT: as per HPI


Respiratory: see HPI


Cardiovascular: as per HPI


Endocrine: see HPI


Gastrointestinal: as per HPI


Genitourinary: as per HPI


Musculoskeletal: as per HPI


Skin: as per HPI


Neurological: as per HPI


Psychiatric: as per HPI


Hematological/Lymphatic: as per HPI





ED Past Medical Hx





- Past Medical History


Previous Medical History?: Yes


Hx Hypertension: Yes


Hx Diabetes: Yes


Hx Renal Disease: Yes (esrd)


Hx Psychiatric Treatment: Yes (bipolar, schizophrenia)


Additional medical history: Parkinsons disease





- Surgical History


Past Surgical History?: No





- Social History


Smoking Status: Current Every Day Smoker


Substance Use Type: None





- Medications


Home Medications: 


                                Home Medications











 Medication  Instructions  Recorded  Confirmed  Last Taken  Type


 


FLUoxetine HCL [FLUoxetine] 20 mg PO DAILY #7 tablet 04/16/20  Unknown Rx


 


OLANzapine [Zyprexa] 10 mg PO BID #14 tablet 04/16/20  Unknown Rx


 


hydrOXYzine PAMOATE [Vistaril] 50 mg PO Q12H #14 capsule 04/16/20  Unknown Rx


 


traZODone [Desyrel] 100 mg PO QHS #7 tablet 04/16/20  Unknown Rx


 


Ondansetron [Zofran Odt] 4 mg PO Q8HR PRN #10 tab.rapdis 06/17/20  Unknown Rx


 


Ondansetron [Zofran Odt] 4 mg PO Q6H #20 tab.rapdis 05/13/21  Unknown Rx


 


cephALEXin [Keflex] 500 mg PO TID #28 cap 06/30/21  Unknown Rx


 


FLUoxetine HCL [Prozac] 20 mg PO DAILY 30 Days #30 capsule 07/01/21  Unknown Rx


 


traZODone [Desyrel] 50 mg PO QHS 30 Days #30 tab 07/01/21  Unknown Rx


 


OLANZapine [Olanzapine] 5 mg PO DAILY #30 tablet 10/28/21  Unknown Rx


 


Acetaminophen [Acetaminophen 8 650 mg PO QID PRN #30 tablet.er 11/10/21  Unknown

 Rx





Hour]     


 


Ibuprofen [Motrin 800 MG tab] 800 mg PO Q8HR PRN #30 tablet 12/01/21  Unknown Rx














ED Physical Exam





- General


General appearance: in no apparent distress





- Head


Head exam: Present: atraumatic, normocephalic





- Eye


Eye exam: Present: normal appearance





- ENT


ENT exam: Present: normal exam, normal orophraynx, mucous membranes moist, 

normal external ear exam





- Neck


Neck exam: Present: normal inspection, full ROM.  Absent: tenderness, 

meningismus





- Respiratory


Respiratory exam: Present: normal lung sounds bilaterally.  Absent: respiratory 

distress, wheezes, rales, rhonchi, stridor, decreased breath sounds





- Cardiovascular


Cardiovascular Exam: Present: regular rate, normal rhythm, normal heart sounds. 

Absent: bradycardia, tachycardia, irregular rhythm, systolic murmur, diastolic 

murmur, rubs, gallop





- GI/Abdominal


GI/Abdominal exam: Present: soft.  Absent: distended, tenderness, guarding, 

rebound, rigid, pulsatile mass





- Rectal


Rectal exam: Present: deferred





- Extremities Exam


Extremities exam: Present: normal inspection, full ROM, other (2+ pulses noted 

in the bilateral upper and lower extremities.  There is no palpable cord.   

negative Homans sign.  Muscular compartments are soft.  The pelvis is stable.). 

Absent: pedal edema, calf tenderness





- Back Exam


Back exam: Present: normal inspection.  Absent: tenderness, CVA tenderness (R), 

CVA tenderness (L), paraspinal tenderness, vertebral tenderness





- Neurological Exam


Neurological exam: Present: alert, normal gait, other (There is no facial droop.

 Ambulatory with a steady gait.)





- Psychiatric


Psychiatric exam: Present: flat affect.  Absent: homicidal ideation, suicidal 

ideation





- Skin


Skin exam: Present: warm, dry, intact, normal color.  Absent: rash





ED Course


                                   Vital Signs











  03/10/22 03/10/22 03/11/22





  22:51 22:58 03:01


 


Temperature 98 F  98.3 F


 


Pulse Rate 74  59 L


 


Respiratory 16  16





Rate   


 


Blood Pressure 118/72  


 


Blood Pressure   95/42





[Right]   


 


O2 Sat by Pulse 100 98 94





Oximetry   














ED Medical Decision Making





- Lab Data








                                   Vital Signs











  03/10/22 03/10/22 03/11/22





  22:51 22:58 03:01


 


Temperature 98 F  98.3 F


 


Pulse Rate 74  59 L


 


Respiratory 16  16





Rate   


 


Blood Pressure 118/72  


 


Blood Pressure   95/42





[Right]   


 


O2 Sat by Pulse 100 98 94





Oximetry   














- Medical Decision Making





Differential diagnosis, including but not limited to: Encounter for medical 

screening exam, encounter for behavioral health screening exam, homelessness





Assessment and plan: 41-year-old gentleman with a known history of homelessness,

 who was cooperative, has not endorsed homicidality or suicidality, as 

documented is being ANO x3, now sleeping comfortably on his chair, and in no 

acute distress.





Patient has not endorsed any acute medical complaints to myself.  He has not 

endorsed any homicidality or suicidality.





Discharge with outpatient follow-up


Critical care attestation.: 


If time is entered above; I have spent that time in minutes in the direct care 

of this critically ill patient, excluding procedure time.








ED Disposition


Clinical Impression: 


 Encounter for medical screening examination, Encounter for behavioral health 

screening





Disposition: 01 HOME / SELF CARE / HOMELESS


Is pt being admited?: No


Does the pt Need Aspirin: No


Condition: Stable


Additional Instructions: 


Please continue current outpatient medications.  Please follow-up with your 

outpatient primary care doctor within the next month.  Please follow-up with 

your outpatient psychiatrist within the next week.





Please return to the emergency room right away with new pain, worsened pain, 

migration of pain, projectile vomiting, change in mental status, confusion, 

inability tolerate liquid feeds, new, worsened or different symptoms not present

 on the initial emergency room evaluation


Referrals: 


ELISA CHIU MD [Primary Care Provider] - 3-5 Days


[facility] [Outside] - 3-5 Days


[facility] [Outside] - 3-5 Days

## 2023-10-17 NOTE — XRAY REPORT
CHEST 2 VIEWS 



INDICATION: 

CP.



COMPARISON: 

11/19/2020



FINDINGS:

Support devices: None.



Heart: Within normal limits. 

Lungs/Pleura: No acute air space or interstitial disease.   No significant pleural effusion. 



IMPRESSION:  No acute findings.



Signer Name: Mc Alonso MD 

Signed: 5/13/2021 1:48 AM

Workstation Name: [id number] Methotrexate Pregnancy And Lactation Text: This medication is Pregnancy Category X and is known to cause fetal harm. This medication is excreted in breast milk.